# Patient Record
Sex: FEMALE | Race: WHITE | NOT HISPANIC OR LATINO | Employment: FULL TIME | URBAN - METROPOLITAN AREA
[De-identification: names, ages, dates, MRNs, and addresses within clinical notes are randomized per-mention and may not be internally consistent; named-entity substitution may affect disease eponyms.]

---

## 2023-02-22 LAB
ALLERGEN ANIMAL: CAT DANDER IGE (KU/L): <0.1 KU/L
ALLERGEN ANIMAL: DOG DANDER IGE (KU/L): <0.1 KU/L
ALLERGEN GRASS: BERMUDA GRASS (CYNODON DACTYLON) IGE (KU/L): <0.1 KU/L
ALLERGEN GRASS: JOHNSON GRASS (SORGHUM HALEPENSE) IGE (KU/L): <0.1 KU/L
ALLERGEN GRASS: MEADOW GRASS, KENTUCKY BLUE (POA PRATENSIS )IGE (KU/L): <0.1 KU/L
ALLERGEN GRASS: TIMOTHY GRASS (PHLEUM PRATENSE) IGE (KU/L): <0.1 KU/L
ALLERGEN INSECT: COCKROACH IGE: <0.1 KU/L
ALLERGEN MICROORGANISM: ALTERNARIA ALTERNATA IGE (KU/L): <0.1 KU/L
ALLERGEN MICROORGANISM: ASPERGILLUS FUMIGATUS IGE (KU/L): <0.1 KU/L
ALLERGEN MICROORGANISM: CLADOSPORIUM HERBARUM IGE (KU/L): <0.1 KU/L
ALLERGEN MICROORGANISM: PENICILLIUM CHRYSOGENUM (P. NOTATUM) IGE (KU/L): <0.1 KU/L
ALLERGEN MITE: DERMATOPHAGOIDES FARINAE (HOUSE DUST MITE) IGE (KU/L): <0.1 KU/L
ALLERGEN MITE: DERMATOPHAGOIDES PTERONYSSINUS (HOUSE DUST MITE) IGE (KU/L): <0.1 KU/L
ALLERGEN TREE: BOX-ELDER (ACER NEGUNDO) IGE (KU/L): <0.1 KU/L
ALLERGEN TREE: COMMON SILVER BIRCH (BETULA VERRUCOSA) IGE (KU/L): <0.1 KU/L
ALLERGEN TREE: COTTONWOOD (POPULUS DELTOIDES) IGE (KU/L): <0.1 KU/L
ALLERGEN TREE: ELM (ULMUS AMERICANA) IGE (KU/L): <0.1 KU/L
ALLERGEN TREE: MAPLE LEAF SYCAMORE, LONDON PLANE IGE (KU/L): <0.1 KU/L
ALLERGEN TREE: MOUNTAIN JUNIPER (JUNIPERUS SABINOIDES) IGE (KU/L): <0.1 KU/L
ALLERGEN TREE: MULBERRY (MORUS ALBA) IGE (KU/L): <0.1 KU/L
ALLERGEN TREE: OAK (QUERCUS ALBA) IGE (KU/L): <0.1 KU/L
ALLERGEN TREE: PECAN, HICKORY (CARYA PECAN) IGE (KU/L): <0.1 KU/L
ALLERGEN TREE: WALNUT IGE: <0.1 KU/L
ALLERGEN TREE: WHITE ASH (FRAXINUS AMERICANA) IGE (KU/L): <0.1 KU/L
ALLERGEN WEED: COMMON PIGWEED (AMARANTHUS RETROFLEXUS) IGE (KU/L): <0.1 KU/L
ALLERGEN WEED: COMMON RAGWEED (AMB. ARTEMISIIFOLIA/A. ELATIOR) IGE (KU/L): <0.1 KU/L
ALLERGEN WEED: GOOSEFOOT, LAMB'S QUARTERS (CHENOPODIUM ALBUM) IGE (KU/L): <0.1 KU/L
ALLERGEN WEED: PLANTAIN (ENGLISH), RIBWORT (PLANTAGO LANCEOLATA) IGE (KU/L): <0.1 KU/L
ALLERGEN WEED: PRICKLY SALTWORT/RUSSIAN THISTLE (SALSOLA KALI) IGE (KU/L): <0.1 KU/L
ALLERGEN WEED: SHEEP SORREL (RUMEX ACETOSELLA) IGE (KU/L): <0.1 KU/L
IMMUNOCAP IGE: 118 KU/L (ref 0–214)
IMMUNOCAP INTERPRETATION: NORMAL

## 2023-08-11 ENCOUNTER — OFFICE VISIT (OUTPATIENT)
Dept: PRIMARY CARE | Facility: CLINIC | Age: 28
End: 2023-08-11
Payer: COMMERCIAL

## 2023-08-11 VITALS
WEIGHT: 107 LBS | HEART RATE: 81 BPM | HEIGHT: 63 IN | OXYGEN SATURATION: 99 % | DIASTOLIC BLOOD PRESSURE: 83 MMHG | BODY MASS INDEX: 18.96 KG/M2 | SYSTOLIC BLOOD PRESSURE: 117 MMHG

## 2023-08-11 DIAGNOSIS — R05.1 ACUTE COUGH: ICD-10-CM

## 2023-08-11 DIAGNOSIS — R09.81 SINUS CONGESTION: Primary | ICD-10-CM

## 2023-08-11 DIAGNOSIS — H93.8X3 EAR PRESSURE, BILATERAL: ICD-10-CM

## 2023-08-11 PROBLEM — J30.9 ALLERGIC RHINITIS: Status: ACTIVE | Noted: 2023-05-31

## 2023-08-11 PROBLEM — N64.82 CONGENITAL HYPOPLASIA OF BREAST: Status: ACTIVE | Noted: 2023-02-15

## 2023-08-11 PROCEDURE — 1036F TOBACCO NON-USER: CPT | Performed by: STUDENT IN AN ORGANIZED HEALTH CARE EDUCATION/TRAINING PROGRAM

## 2023-08-11 PROCEDURE — 99213 OFFICE O/P EST LOW 20 MIN: CPT | Performed by: STUDENT IN AN ORGANIZED HEALTH CARE EDUCATION/TRAINING PROGRAM

## 2023-08-11 RX ORDER — METHYLPREDNISOLONE 4 MG/1
TABLET ORAL
Qty: 21 TABLET | Refills: 0 | Status: SHIPPED | OUTPATIENT
Start: 2023-08-11 | End: 2023-08-25

## 2023-08-11 ASSESSMENT — ENCOUNTER SYMPTOMS
ABDOMINAL PAIN: 0
HEADACHES: 1
FATIGUE: 0
VOMITING: 0
SHORTNESS OF BREATH: 0
PALPITATIONS: 0
CHILLS: 0
LIGHT-HEADEDNESS: 0
FREQUENCY: 0
FEVER: 0
COUGH: 1
MYALGIAS: 0
DIZZINESS: 1
DIARRHEA: 0
CONSTIPATION: 0
NAUSEA: 0
ARTHRALGIAS: 0
RHINORRHEA: 0

## 2023-08-11 NOTE — PROGRESS NOTES
"Subjective   Patient ID: Aretha Nuñez is a 28 y.o. female who presents for Earache and Headache.    Earache   Associated symptoms include coughing and headaches. Pertinent negatives include no abdominal pain, diarrhea, rhinorrhea or vomiting.   Headache   Associated symptoms include coughing, dizziness and ear pain. Pertinent negatives include no abdominal pain, fever, nausea, rhinorrhea or vomiting.        She was in [facility] last week. There was a lot of elevation changes.  It started with dizziness. Started while driving.  She has been having dizziness and head fog.  Both ears have been hurting.  She has also been having a lot of drainage.  She was taking a sinus medication and then they gave her an antibiotic at the urgent care in [facility].  Her daughter has a runny nose.    Review of Systems   Constitutional:  Negative for chills, fatigue and fever.   HENT:  Positive for ear pain. Negative for congestion and rhinorrhea.    Respiratory:  Positive for cough. Negative for shortness of breath.    Cardiovascular:  Negative for chest pain and palpitations.   Gastrointestinal:  Negative for abdominal pain, constipation, diarrhea, nausea and vomiting.   Genitourinary:  Negative for decreased urine volume and frequency.   Musculoskeletal:  Negative for arthralgias and myalgias.   Neurological:  Positive for dizziness and headaches. Negative for light-headedness.       Objective   /83   Pulse 81   Ht 1.6 m (5' 3\")   Wt 48.5 kg (107 lb)   SpO2 99%   BMI 18.95 kg/m²     Physical Exam  Vitals and nursing note reviewed.   Constitutional:       General: She is not in acute distress.     Appearance: Normal appearance. She is normal weight. She is not ill-appearing or toxic-appearing.   HENT:      Head: Normocephalic and atraumatic.      Right Ear: Tympanic membrane, ear canal and external ear normal.      Left Ear: Tympanic membrane, ear canal and external ear normal.      Nose: Congestion (worse on right) " present.      Mouth/Throat:      Mouth: Mucous membranes are moist.      Comments: Mucus drainage down the posterior oropharynx  Eyes:      Extraocular Movements: Extraocular movements intact.      Conjunctiva/sclera: Conjunctivae normal.      Pupils: Pupils are equal, round, and reactive to light.   Cardiovascular:      Rate and Rhythm: Normal rate and regular rhythm.      Heart sounds: Normal heart sounds.   Pulmonary:      Effort: Pulmonary effort is normal.      Breath sounds: Normal breath sounds.   Abdominal:      General: Abdomen is flat. Bowel sounds are normal.      Palpations: Abdomen is soft.   Neurological:      Mental Status: She is alert.         Assessment/Plan   Problem List Items Addressed This Visit    None  Visit Diagnoses       Sinus congestion    -  Primary    Relevant Medications    methylPREDNISolone (Medrol Dospak) 4 mg tablets    Ear pressure, bilateral        Relevant Medications    methylPREDNISolone (Medrol Dospak) 4 mg tablets    Acute cough        Relevant Medications    methylPREDNISolone (Medrol Dospak) 4 mg tablets          History and physical examination as above.  Patient resenting for upper respiratory concerns.  She been given an antibiotic from an urgent care which has not really helped her symptoms.  Likely viral in nature.  Started on symptomatic treatment as discussed.  Did give a Medrol Dosepak if symptoms worsen over the weekend.  Discussed signs and symptoms that would require reevaluation in the office versus immediate treatment in the emergency department.  She is understanding and agreement with this plan.

## 2023-08-11 NOTE — PATIENT INSTRUCTIONS
Flonase  Allergy medication like Zyrtec  Nasal saline to keep the nose moist.  Netti Pot  Ibuprofen (Do not take if you are taking the steroid)

## 2023-08-25 ENCOUNTER — OFFICE VISIT (OUTPATIENT)
Dept: PRIMARY CARE | Facility: CLINIC | Age: 28
End: 2023-08-25
Payer: COMMERCIAL

## 2023-08-25 ENCOUNTER — LAB (OUTPATIENT)
Dept: LAB | Facility: LAB | Age: 28
End: 2023-08-25
Payer: COMMERCIAL

## 2023-08-25 VITALS
SYSTOLIC BLOOD PRESSURE: 106 MMHG | HEART RATE: 63 BPM | HEIGHT: 63 IN | TEMPERATURE: 97.6 F | BODY MASS INDEX: 19.31 KG/M2 | WEIGHT: 109 LBS | DIASTOLIC BLOOD PRESSURE: 70 MMHG | OXYGEN SATURATION: 100 %

## 2023-08-25 DIAGNOSIS — R42 VERTIGO: ICD-10-CM

## 2023-08-25 DIAGNOSIS — T78.40XD ALLERGIC REACTION, SUBSEQUENT ENCOUNTER: Primary | ICD-10-CM

## 2023-08-25 DIAGNOSIS — R42 LIGHTHEADEDNESS: ICD-10-CM

## 2023-08-25 DIAGNOSIS — T78.40XD ALLERGIC REACTION, SUBSEQUENT ENCOUNTER: ICD-10-CM

## 2023-08-25 DIAGNOSIS — R05.2 SUBACUTE COUGH: ICD-10-CM

## 2023-08-25 PROBLEM — T78.40XA ALLERGIC REACTION: Status: ACTIVE | Noted: 2023-08-25

## 2023-08-25 PROCEDURE — 80053 COMPREHEN METABOLIC PANEL: CPT

## 2023-08-25 PROCEDURE — 1036F TOBACCO NON-USER: CPT | Performed by: FAMILY MEDICINE

## 2023-08-25 PROCEDURE — 36415 COLL VENOUS BLD VENIPUNCTURE: CPT

## 2023-08-25 PROCEDURE — 85025 COMPLETE CBC W/AUTO DIFF WBC: CPT

## 2023-08-25 PROCEDURE — 99214 OFFICE O/P EST MOD 30 MIN: CPT | Performed by: FAMILY MEDICINE

## 2023-08-25 ASSESSMENT — PATIENT HEALTH QUESTIONNAIRE - PHQ9
SUM OF ALL RESPONSES TO PHQ9 QUESTIONS 1 AND 2: 0
1. LITTLE INTEREST OR PLEASURE IN DOING THINGS: NOT AT ALL
2. FEELING DOWN, DEPRESSED OR HOPELESS: NOT AT ALL

## 2023-08-25 NOTE — PATIENT INSTRUCTIONS
Physical exam looks very good today.  Evaluating further for the symptoms or if you are feeling.    Going to do CMP, CBC, chest x-ray.  If all these things look good may need imaging of the brain and ENT evaluation.    Do not drive if you are dizzy or drowsy.    Please do not take amoxicillin.

## 2023-08-25 NOTE — PROGRESS NOTES
"Subjective   Patient ID: Aretha Nuñez is a 28 y.o. female who presents for Follow-up (ER ; feeling light headed).    Patient states over the last 4 weeks just felt off.  Most little balance issue Is swimming in the head.    Patient went to [facility] developed pressure in the sinus area then got treated for sinusitis.  Ended up on Augmentin therapy then after completing Augmentin therapy she broke out in a rash all over had hives all over and is felt to be a delayed reaction to the amoxicillin in the Augmentin therapy.    Patient was on a Medrol Dosepak and an antihistamine this has resolved but still having some swimming in the head??  Having a little bit of dizziness as well.    Patient also has had a little bit of cough.    Patient's had no chest pain no swelling of the legs or feet.  States that she wears a smart watch.  Watch is good her heart rhythm and rate have looked good as well.    Patient is on her menstrual cycle at this time.    Patient had no fever no chills or night sweats.  She was exposed to hand-foot-and-mouth disease and felt like she may have had that as well she states her had a little discomfort in the hands and feet     patient presents for follow-up from the ER.  Patient felt somewhat lightheaded.    Some light headedness.  Some sinus congestion .    Some dizziness.    Took     Review of Systems  As noted in HPI  Objective   /70   Pulse 63   Temp 36.4 °C (97.6 °F)   Ht 1.6 m (5' 3\")   Wt 49.4 kg (109 lb)   LMP 08/23/2023 (Exact Date)   SpO2 100%   BMI 19.31 kg/m²   BSA Body surface area is 1.48 meters squared.      Physical Exam  Cardiovascular:      Rate and Rhythm: Normal rate and regular rhythm.      Pulses: Normal pulses.      Heart sounds: Normal heart sounds.   Pulmonary:      Effort: Pulmonary effort is normal.      Breath sounds: Normal breath sounds.   Skin:     General: Skin is warm.   Neurological:      Mental Status: She is alert.     Ears nose and throat were " unremarkable.  Menard-Hallpike maneuver could not reproduce vertigo.  No ataxia appreciated this time.  Able to balance on 1 foot and the other foot without difficulty Romberg testing negative.  Can heel-to-toe walk without any difficulty.  Able to stand on tiptoes and heels without difficulty.  Finger-to-nose testing intact  Orders Only on 02/21/2023   Component Date Value Ref Range Status    Immunocap IgE 02/21/2023 118.0  0.0 - 214.0 KU/L Final    Comment:  Note:  Omalizumab (Xolair, GeneTMMI ([employer] humanized    IgG1 antihuman IgE Fc) treatment does not    significantly interfere with the accuracy of    total IgE on the ImmunoCAP ([employer]) platform.    J Allergy Clin Immunol 2006;117:759-66).   Allergens, parasitic diseases, smoking, and   alcohol consumption have been reported to   increase levels of total IgE in serum.      Bermuda Grass IgE 02/21/2023 <0.10  <0.35 KU/L Final      SEE IMMUNOCAP INTERP.IGE     Celio Grass IgE 02/21/2023 <0.10  <0.35 KU/L Final      SEE IMMUNOCAP INTERP.IGE     Gilliam Grass, [place] Blue IgE 02/21/2023 <0.10  <0.35 KU/L Final      SEE IMMUNOCAP INTERP.IGE     Juan Grass IgE 02/21/2023 <0.10  <0.35 KU/L Final      SEE IMMUNOCAP INTERP.IGE     Go[place], [place] IgE 02/21/2023 <0.10  <0.35 KU/L Final      SEE IMMUNOCAP INTERP.IGE     Common Pigweed IgE 02/21/2023 <0.10  <0.35 KU/L Final      SEE IMMUNOCAP INTERP.IGE     Common Ragweed IgE 02/21/2023 <0.10  <0.35 KU/L Final      SEE IMMUNOCAP INTERP.IGE     White Serafin IgE 02/21/2023 <0.10  <0.35 KU/L Final      SEE IMMUNOCAP INTERP.IGE     Common Silver Birch IgE 02/21/2023 <0.10  <0.35 KU/L Final      SEE IMMUNOCAP INTERP.IGE     Box-Elder IgE 02/21/2023 <0.10  <0.35 KU/L Final      SEE IMMUNOCAP INTERP.IGE     Mountain Juniper IgE 02/21/2023 <0.10  <0.35 KU/L Final      SEE IMMUNOCAP INTERP.IGE     [place] IgE 02/21/2023 <0.10  <0.35 KU/L Final      SEE IMMUNOCAP INTERP.IGE     El IgE 02/21/2023 <0.10  <0.35 KU/L Final       SEE IMMUNOCAP INTERP.IGE     [place] IgE 02/21/2023 <0.10  <0.35 KU/L Final      SEE IMMUNOCAP INTERP.IGE     [place] IgE 02/21/2023 <0.10  <0.35 KU/L Final      SEE IMMUNOCAP INTERP.IGE     Pecan, [place] IgE 02/21/2023 <0.10  <0.35 KU/L Final      SEE IMMUNOCAP INTERP.IGE     [place], [place] * 02/21/2023 <0.10  <0.35 KU/L Final      SEE IMMUNOCAP INTERP.IGE     [place] Tree IgE 02/21/2023 <0.10  <0.35 KU/L Final      SEE IMMUNOCAP INTERP.IGE     Prickly Saltwort/Russian Thistle I* 02/21/2023 <0.10  <0.35 KU/L Final      SEE IMMUNOCAP INTERP.IGE     Sheep Sorrel IgE 02/21/2023 <0.10  <0.35 KU/L Final      SEE IMMUNOCAP INTERP.IGE     Cat Dander IgE 02/21/2023 <0.10  <0.35 KU/L Final      SEE IMMUNOCAP INTERP.IGE     Dog Dander IgE 02/21/2023 <0.10  <0.35 KU/L Final      SEE IMMUNOCAP INTERP.IGE     Alternaria Alternata IgE 02/21/2023 <0.10  <0.35 KU/L Final      SEE IMMUNOCAP INTERP.IGE     Aspergillus Fumigatus IgE 02/21/2023 <0.10  <0.35 KU/L Final      SEE IMMUNOCAP INTERP.IGE     Cladosporium Herbarum IgE 02/21/2023 <0.10  <0.35 KU/L Final      SEE IMMUNOCAP INTERP.IGE     Penicillium Chrysogenum (P. notatu* 02/21/2023 <0.10  <0.35 KU/L Final      SEE IMMUNOCAP INTERP.IGE     Plantain IgE 02/21/2023 <0.10  <0.35 KU/L Final      SEE IMMUNOCAP INTERP.IGE     Dust Mite (D. farinae) IgE 02/21/2023 <0.10  <0.35 KU/L Final      SEE IMMUNOCAP INTERP.IGE     Dust Mite (D. pteronyssinus) IgE 02/21/2023 <0.10  <0.35 KU/L Final      SEE IMMUNOCAP INTERP.IGE     Nigerian Cockroach IgE 02/21/2023 <0.10  <0.35 KU/L Final      SEE IMMUNOCAP INTERP.IGE     Immunocap Interpretation 02/21/2023 SEE COMMENT   Final    Comment:      REFERENCE RANGE (IMMUNOCAP) IGE   KU/L           CLASS     INTERPRETATION       <  0.10       0       BELOW DETECTION   0.10-  0.34      0/1      EQUIVOCAL   0.35-  0.69       1       LOW POSITIVE   0.70-  3.49       2       MODERATE POSITIVE   3.50- 17.49       3       HIGH  POSITIVE  17.50- 49          4       VERY HIGH POSITIVE  50   - 99          5       VERY HIGH POSITIVE       >100          6       VERY HIGH POSITIVE       Current Outpatient Medications on File Prior to Visit   Medication Sig Dispense Refill    [DISCONTINUED] methylPREDNISolone (Medrol Dospak) 4 mg tablets Take as directed on package. 21 tablet 0     No current facility-administered medications on file prior to visit.     No images are attached to the encounter.            Assessment/Plan   Problem List Items Addressed This Visit       Vertigo    Subacute cough    Allergic reaction - Primary    Lightheadedness

## 2023-08-26 LAB
ALANINE AMINOTRANSFERASE (SGPT) (U/L) IN SER/PLAS: 9 U/L (ref 7–45)
ALBUMIN (G/DL) IN SER/PLAS: 4.7 G/DL (ref 3.4–5)
ALKALINE PHOSPHATASE (U/L) IN SER/PLAS: 41 U/L (ref 33–110)
ANION GAP IN SER/PLAS: 12 MMOL/L (ref 10–20)
ASPARTATE AMINOTRANSFERASE (SGOT) (U/L) IN SER/PLAS: 15 U/L (ref 9–39)
BASOPHILS (10*3/UL) IN BLOOD BY AUTOMATED COUNT: 0.03 X10E9/L (ref 0–0.1)
BASOPHILS/100 LEUKOCYTES IN BLOOD BY AUTOMATED COUNT: 0.5 % (ref 0–2)
BILIRUBIN TOTAL (MG/DL) IN SER/PLAS: 0.7 MG/DL (ref 0–1.2)
CALCIUM (MG/DL) IN SER/PLAS: 10 MG/DL (ref 8.6–10.6)
CARBON DIOXIDE, TOTAL (MMOL/L) IN SER/PLAS: 26 MMOL/L (ref 21–32)
CHLORIDE (MMOL/L) IN SER/PLAS: 107 MMOL/L (ref 98–107)
CREATININE (MG/DL) IN SER/PLAS: 0.61 MG/DL (ref 0.5–1.05)
EOSINOPHILS (10*3/UL) IN BLOOD BY AUTOMATED COUNT: 0.29 X10E9/L (ref 0–0.7)
EOSINOPHILS/100 LEUKOCYTES IN BLOOD BY AUTOMATED COUNT: 5 % (ref 0–6)
ERYTHROCYTE DISTRIBUTION WIDTH (RATIO) BY AUTOMATED COUNT: 12 % (ref 11.5–14.5)
ERYTHROCYTE MEAN CORPUSCULAR HEMOGLOBIN CONCENTRATION (G/DL) BY AUTOMATED: 32.4 G/DL (ref 32–36)
ERYTHROCYTE MEAN CORPUSCULAR VOLUME (FL) BY AUTOMATED COUNT: 99 FL (ref 80–100)
ERYTHROCYTES (10*6/UL) IN BLOOD BY AUTOMATED COUNT: 4.14 X10E12/L (ref 4–5.2)
GFR FEMALE: >90 ML/MIN/1.73M2
GLUCOSE (MG/DL) IN SER/PLAS: 90 MG/DL (ref 74–99)
HEMATOCRIT (%) IN BLOOD BY AUTOMATED COUNT: 41.1 % (ref 36–46)
HEMOGLOBIN (G/DL) IN BLOOD: 13.3 G/DL (ref 12–16)
IMMATURE GRANULOCYTES/100 LEUKOCYTES IN BLOOD BY AUTOMATED COUNT: 0.3 % (ref 0–0.9)
LEUKOCYTES (10*3/UL) IN BLOOD BY AUTOMATED COUNT: 5.8 X10E9/L (ref 4.4–11.3)
LYMPHOCYTES (10*3/UL) IN BLOOD BY AUTOMATED COUNT: 2.19 X10E9/L (ref 1.2–4.8)
LYMPHOCYTES/100 LEUKOCYTES IN BLOOD BY AUTOMATED COUNT: 38 % (ref 13–44)
MONOCYTES (10*3/UL) IN BLOOD BY AUTOMATED COUNT: 0.43 X10E9/L (ref 0.1–1)
MONOCYTES/100 LEUKOCYTES IN BLOOD BY AUTOMATED COUNT: 7.5 % (ref 2–10)
NEUTROPHILS (10*3/UL) IN BLOOD BY AUTOMATED COUNT: 2.8 X10E9/L (ref 1.2–7.7)
NEUTROPHILS/100 LEUKOCYTES IN BLOOD BY AUTOMATED COUNT: 48.7 % (ref 40–80)
NRBC (PER 100 WBCS) BY AUTOMATED COUNT: 0 /100 WBC (ref 0–0)
PLATELETS (10*3/UL) IN BLOOD AUTOMATED COUNT: 241 X10E9/L (ref 150–450)
POTASSIUM (MMOL/L) IN SER/PLAS: 3.9 MMOL/L (ref 3.5–5.3)
PROTEIN TOTAL: 7.4 G/DL (ref 6.4–8.2)
SODIUM (MMOL/L) IN SER/PLAS: 141 MMOL/L (ref 136–145)
UREA NITROGEN (MG/DL) IN SER/PLAS: 8 MG/DL (ref 6–23)

## 2023-08-28 DIAGNOSIS — Z00.00 HEALTH MAINTENANCE EXAMINATION: ICD-10-CM

## 2023-08-28 DIAGNOSIS — R42 VERTIGO: ICD-10-CM

## 2023-09-13 LAB
ALANINE AMINOTRANSFERASE (SGPT) (U/L) IN SER/PLAS: 9 U/L (ref 7–45)
ALBUMIN (G/DL) IN SER/PLAS: 4.7 G/DL (ref 3.4–5)
ALKALINE PHOSPHATASE (U/L) IN SER/PLAS: 47 U/L (ref 33–110)
ANION GAP IN SER/PLAS: 11 MMOL/L (ref 10–20)
ASPARTATE AMINOTRANSFERASE (SGOT) (U/L) IN SER/PLAS: 16 U/L (ref 9–39)
BASOPHILS (10*3/UL) IN BLOOD BY AUTOMATED COUNT: 0.03 X10E9/L (ref 0–0.1)
BASOPHILS/100 LEUKOCYTES IN BLOOD BY AUTOMATED COUNT: 0.4 % (ref 0–2)
BILIRUBIN TOTAL (MG/DL) IN SER/PLAS: 0.5 MG/DL (ref 0–1.2)
CALCIUM (MG/DL) IN SER/PLAS: 9.6 MG/DL (ref 8.6–10.3)
CARBON DIOXIDE, TOTAL (MMOL/L) IN SER/PLAS: 28 MMOL/L (ref 21–32)
CHLORIDE (MMOL/L) IN SER/PLAS: 105 MMOL/L (ref 98–107)
CREATININE (MG/DL) IN SER/PLAS: 0.91 MG/DL (ref 0.5–1.05)
EOSINOPHILS (10*3/UL) IN BLOOD BY AUTOMATED COUNT: 0.71 X10E9/L (ref 0–0.7)
EOSINOPHILS/100 LEUKOCYTES IN BLOOD BY AUTOMATED COUNT: 8.9 % (ref 0–6)
ERYTHROCYTE DISTRIBUTION WIDTH (RATIO) BY AUTOMATED COUNT: 12.1 % (ref 11.5–14.5)
ERYTHROCYTE MEAN CORPUSCULAR HEMOGLOBIN CONCENTRATION (G/DL) BY AUTOMATED: 33.3 G/DL (ref 32–36)
ERYTHROCYTE MEAN CORPUSCULAR VOLUME (FL) BY AUTOMATED COUNT: 96 FL (ref 80–100)
ERYTHROCYTES (10*6/UL) IN BLOOD BY AUTOMATED COUNT: 4.38 X10E12/L (ref 4–5.2)
GFR FEMALE: 87 ML/MIN/1.73M2
GLUCOSE (MG/DL) IN SER/PLAS: 72 MG/DL (ref 74–99)
HEMATOCRIT (%) IN BLOOD BY AUTOMATED COUNT: 42 % (ref 36–46)
HEMOGLOBIN (G/DL) IN BLOOD: 14 G/DL (ref 12–16)
IGA (MG/DL) IN SER/PLAS: 280 MG/DL (ref 70–400)
IGG (MG/DL) IN SER/PLAS: 1470 MG/DL (ref 700–1600)
IGM (MG/DL) IN SER/PLAS: 110 MG/DL (ref 40–230)
IMMATURE GRANULOCYTES/100 LEUKOCYTES IN BLOOD BY AUTOMATED COUNT: 0.4 % (ref 0–0.9)
LEUKOCYTES (10*3/UL) IN BLOOD BY AUTOMATED COUNT: 8 X10E9/L (ref 4.4–11.3)
LYMPHOCYTES (10*3/UL) IN BLOOD BY AUTOMATED COUNT: 2.37 X10E9/L (ref 1.2–4.8)
LYMPHOCYTES/100 LEUKOCYTES IN BLOOD BY AUTOMATED COUNT: 29.7 % (ref 13–44)
MONOCYTES (10*3/UL) IN BLOOD BY AUTOMATED COUNT: 0.56 X10E9/L (ref 0.1–1)
MONOCYTES/100 LEUKOCYTES IN BLOOD BY AUTOMATED COUNT: 7 % (ref 2–10)
NEUTROPHILS (10*3/UL) IN BLOOD BY AUTOMATED COUNT: 4.27 X10E9/L (ref 1.2–7.7)
NEUTROPHILS/100 LEUKOCYTES IN BLOOD BY AUTOMATED COUNT: 53.6 % (ref 40–80)
PLATELETS (10*3/UL) IN BLOOD AUTOMATED COUNT: 325 X10E9/L (ref 150–450)
POTASSIUM (MMOL/L) IN SER/PLAS: 4.3 MMOL/L (ref 3.5–5.3)
PROTEIN TOTAL: 8 G/DL (ref 6.4–8.2)
SODIUM (MMOL/L) IN SER/PLAS: 140 MMOL/L (ref 136–145)
UREA NITROGEN (MG/DL) IN SER/PLAS: 13 MG/DL (ref 6–23)

## 2023-09-14 ENCOUNTER — TELEPHONE (OUTPATIENT)
Dept: PRIMARY CARE | Facility: CLINIC | Age: 28
End: 2023-09-14
Payer: COMMERCIAL

## 2023-09-14 NOTE — TELEPHONE ENCOUNTER
Pt had labs done yesterday at [facility]. It showed her blood sugar was low and she wanted to know if that's the cause for her dizziness. Please advice.

## 2023-09-15 DIAGNOSIS — R42 LIGHTHEADEDNESS: ICD-10-CM

## 2023-09-18 ENCOUNTER — LAB (OUTPATIENT)
Dept: LAB | Facility: LAB | Age: 28
End: 2023-09-18
Payer: COMMERCIAL

## 2023-09-18 DIAGNOSIS — R42 LIGHTHEADEDNESS: ICD-10-CM

## 2023-09-18 DIAGNOSIS — Z00.00 HEALTH MAINTENANCE EXAMINATION: ICD-10-CM

## 2023-09-18 LAB — COMPLEMENT TOTAL (CH50): 60.2 U/ML (ref 38.7–89.9)

## 2023-09-18 PROCEDURE — 82947 ASSAY GLUCOSE BLOOD QUANT: CPT

## 2023-09-18 PROCEDURE — 84702 CHORIONIC GONADOTROPIN TEST: CPT

## 2023-09-18 PROCEDURE — 36415 COLL VENOUS BLD VENIPUNCTURE: CPT

## 2023-09-19 LAB — CHORIOGONADOTROPIN (MIU/ML) IN SER/PLAS: <3 IU/L

## 2023-09-21 LAB
PNEUMO SEROTYPE INTERPRETATION: NORMAL
SEROTYPE 1, VIRC: 0.84 UG/ML
SEROTYPE 10A(34), VIRC: 3.47 UG/ML
SEROTYPE 11A(43), VIRC: 1.11 UG/ML
SEROTYPE 12F, VIRC: 0.77 UG/ML
SEROTYPE 14, VIRC: 0.27 UG/ML
SEROTYPE 15B(54), VIRC: 12.21 UG/ML
SEROTYPE 17F, VIRC: 1.16 UG/ML
SEROTYPE 18C(56), VIRC: 1.95 UG/ML
SEROTYPE 19A(57), VIRC: 3.12 UG/ML
SEROTYPE 19F, VIRC: 5.54 UG/ML
SEROTYPE 2, VIRC: 4.17 UG/ML
SEROTYPE 20, VIRC: 0.57 UG/ML
SEROTYPE 22F, VIRC: 0.51 UG/ML
SEROTYPE 23F, VIRC: 2.59 UG/ML
SEROTYPE 3, VIRC: 6.11 UG/ML
SEROTYPE 33F(70), VIRC: 1.92 UG/ML
SEROTYPE 4, VIRC: 1.16 UG/ML
SEROTYPE 5, VIRC: 0.34 UG/ML
SEROTYPE 6B(26), VIRC: 0.77 UG/ML
SEROTYPE 7F(51), VIRC: 0.77 UG/ML
SEROTYPE 8, VIRC: 0.45 UG/ML
SEROTYPE 9N, VIRC: 5.75 UG/ML
SEROTYPE 9V(68), VIRC: 2.65 UG/ML

## 2023-09-22 LAB
GLUCOSE TOLERANCE TEST FASTING: 57 MG/DL
GLUCOSE TOLERANCE TEST TWO HOUR: NORMAL MG/DL
GTTC3: NORMAL

## 2023-09-26 DIAGNOSIS — R42 LIGHTHEADEDNESS: ICD-10-CM

## 2023-09-26 PROBLEM — J32.9 CHRONIC SINUSITIS: Status: ACTIVE | Noted: 2023-09-26

## 2023-09-29 ENCOUNTER — TELEPHONE (OUTPATIENT)
Dept: PRIMARY CARE | Facility: CLINIC | Age: 28
End: 2023-09-29
Payer: COMMERCIAL

## 2023-09-29 DIAGNOSIS — R73.09 ABNORMAL GLUCOSE LEVEL: ICD-10-CM

## 2023-09-29 DIAGNOSIS — I73.9 MICROANGIOPATHY (CMS-HCC): ICD-10-CM

## 2023-09-29 DIAGNOSIS — G43.909 MIGRAINE WITHOUT STATUS MIGRAINOSUS, NOT INTRACTABLE, UNSPECIFIED MIGRAINE TYPE: ICD-10-CM

## 2023-09-29 RX ORDER — MONTELUKAST SODIUM 10 MG/1
1 TABLET ORAL DAILY
COMMUNITY
Start: 2023-09-13 | End: 2023-12-13 | Stop reason: ALTCHOICE

## 2023-09-29 NOTE — TELEPHONE ENCOUNTER
Pt given n&n of Dr. Gupta #[phone] and Dr. Tavera #[phone]. Referrals faxed and pt will make an appt.

## 2023-10-13 ENCOUNTER — APPOINTMENT (OUTPATIENT)
Dept: RADIOLOGY | Facility: CLINIC | Age: 28
End: 2023-10-13
Payer: COMMERCIAL

## 2023-10-17 ENCOUNTER — ANCILLARY PROCEDURE (OUTPATIENT)
Dept: RADIOLOGY | Facility: CLINIC | Age: 28
End: 2023-10-17
Payer: COMMERCIAL

## 2023-10-17 DIAGNOSIS — J32.9 CHRONIC SINUSITIS, UNSPECIFIED: ICD-10-CM

## 2023-10-17 PROCEDURE — 70486 CT MAXILLOFACIAL W/O DYE: CPT

## 2023-10-17 PROCEDURE — 70486 CT MAXILLOFACIAL W/O DYE: CPT | Performed by: RADIOLOGY

## 2023-10-18 DIAGNOSIS — J32.2 CHRONIC ETHMOIDAL SINUSITIS: Primary | ICD-10-CM

## 2023-10-18 RX ORDER — DOXYCYCLINE 100 MG/1
100 CAPSULE ORAL 2 TIMES DAILY
Qty: 40 CAPSULE | Refills: 0 | Status: SHIPPED | OUTPATIENT
Start: 2023-10-18 | End: 2023-11-07

## 2023-11-01 ENCOUNTER — OFFICE VISIT (OUTPATIENT)
Dept: OTOLARYNGOLOGY | Facility: CLINIC | Age: 28
End: 2023-11-01
Payer: COMMERCIAL

## 2023-11-01 VITALS — BODY MASS INDEX: 20.13 KG/M2 | TEMPERATURE: 97.7 F | WEIGHT: 109.4 LBS | HEIGHT: 62 IN

## 2023-11-01 DIAGNOSIS — J34.89 NASAL OBSTRUCTION: ICD-10-CM

## 2023-11-01 DIAGNOSIS — J34.89 NASAL AND SINUS DISCHARGE: ICD-10-CM

## 2023-11-01 DIAGNOSIS — J34.3 HYPERTROPHY OF INFERIOR NASAL TURBINATE: ICD-10-CM

## 2023-11-01 DIAGNOSIS — J32.2 CHRONIC ETHMOIDAL SINUSITIS: ICD-10-CM

## 2023-11-01 DIAGNOSIS — J32.4 CHRONIC PANSINUSITIS: Primary | ICD-10-CM

## 2023-11-01 PROCEDURE — 1036F TOBACCO NON-USER: CPT | Performed by: NURSE PRACTITIONER

## 2023-11-01 PROCEDURE — 99204 OFFICE O/P NEW MOD 45 MIN: CPT | Performed by: NURSE PRACTITIONER

## 2023-11-01 PROCEDURE — 31231 NASAL ENDOSCOPY DX: CPT | Performed by: NURSE PRACTITIONER

## 2023-11-01 RX ORDER — PREDNISONE 10 MG/1
TABLET ORAL
Qty: 19 TABLET | Refills: 0 | Status: SHIPPED | OUTPATIENT
Start: 2023-11-01 | End: 2024-01-03 | Stop reason: HOSPADM

## 2023-11-01 ASSESSMENT — PATIENT HEALTH QUESTIONNAIRE - PHQ9
2. FEELING DOWN, DEPRESSED OR HOPELESS: NOT AT ALL
1. LITTLE INTEREST OR PLEASURE IN DOING THINGS: NOT AT ALL
SUM OF ALL RESPONSES TO PHQ9 QUESTIONS 1 AND 2: 0

## 2023-11-01 NOTE — PROGRESS NOTES
Subjective   Patient ID: Aretha Nuñez is a 28 y.o. female who presents for Sinus Problem.  HPI  Aretha Nuñez is a 28 y.o. old female, referred by Dr. Jovita Gaona,  presenting with complaints of sinus and nose problems that began about 1.5 years ago. The patient describes the sinus/nose problems as gradual onset of nasal obstruction (bilateral, worse with laying down). She also notes nasal drainage (anterior/posterior, clear). She also has frontal area pressure. She has had a significantly diminished sense of smell over the last year. Patient denies facial pain, periorbital edema, throat clearing, hoarseness, loss of taste. The patient denies epistaxis. The patient has taken antibiotics, claritin medications to alleviate the problem. She has tried flonase, vicks. The medication claritin has  helped. The patient has tried nasal saline irrigations. Does not have a history of allergic rhinitis or allergies. They deny a history of aspirin sensitivity. They report 2 sinus infections per year requiring antibiotic treatment. Most recent antibiotic usage includes: Doxycycline (current) x 20 days (on day 14).     History of prior nasal/sinus surgery or procedure: Denies    I personally reviewed the patients CT scan images and results. I discussed the results personally with the patient. The following findings were discussed: 10/17/23: CT Sinus: Left nasal septal deviation. Near complete opacification of the ethmoid sinuses bilaterally. Moderate mucosal thickening of the bilateral maxillary sinuses. Minimal of the frontal, sphenoid sinuses.     I have also reviewed prior note from Dr. Jovita Gaona dated 10/11/23  and this is contributing to my history and assessment.     Review of Systems  Review of systems is negative for constitutional, ophthalmological, cardiac, pulmonary, renal, gastrointestinal, musculoskeletal, mental health, endocrine, or neurologic disorders (except as listed in the HPI, PMH, and Problem  List).     Objective   Physical Exam  CONSTITUTIONAL: Vital signs reviewed. Patient appears well developed and well nourished.   GENERAL: this is a healthy appearing female who appears stated age. The patient is alert and appropriately verbally conversant without hoarseness. This patient is in no apparent distress.   FACE: The face was inspected and no cutaneous masses or lesions were visualized. There was no erythema or edema noted. Facial movement was symmetric. No skin lesions were detected. There was no sinus tenderness elicited. TMJ crepitus present.   EYES: Extra-ocular muscle function was intact. No nystagmus was observed. Pupils were equal.   CRANIAL NERVES: Cranial nerves II, III, IV, and VI were noted to be intact via extra-ocular muscle movement testing. Cranial nerve VII noted to be intact and symmetric by facial movement. Cranial nerves IX and X noted to be intact by gag reflex and palatal movement. Cranial nerve XII noted to be intact by active and symmetric tongue movement.   NOSE: Examination of the nose revealed the nasal dorsum to be midline. Intranasal exam reveals the septum is deviated left. The inferior turbinates were hypertrophic. No masses, polyps, mucopus, or other lesion on anterior rhinoscopy. See below procedure note as applicable for further exam.  ORAL CAVITY: Examination of the oral cavity revealed no mass lesions nor infection. The palate was noted to be intact. The tongue exhibited normal mobility. Mucosa was moist without lesion. The lips were free of lesion. Gums were free of inflammation. Dentition: normal without obvious infection or inflammation  OROPHARYNX: The oral pharynx was free of mass lesion or mucosal abnormality. The palate was noted to be without lesion. The uvula was normal appearing. The tonsils were Normal.  EARS: Examination of the ears revealed that the auricles were normally formed with no lesions. The external auditory canals were normal. The tympanic membranes  were intact.  There is no inflammation visualized.   NECK: Visualization and palpation of the neck revealed no mass lesions. No skin lesions or inflammatory processes were detected. The cervical musculature was normal to palpation.   CERVICAL LYMPHATICS: There were no palpable lymph nodes in the posterior triangle, submandibular triangle, jugulodigastric region, or central neck.  RESPIRATORY: Normal inspiration and expiration and chest wall expansion, no use of accessory muscles to breathe, no stridor.  NEUROLOGICAL: Patient is ambulatory without assist. Mentation is clear. Answering questions appropriately.     Nasal / sinus endoscopy    Date/Time: 11/1/2023 2:48 PM    Performed by: CHYNA Nelson  Authorized by: CHYNA Nelson    Consent:     Consent obtained:  Verbal    Consent given by:  Patient    Risks discussed:  Bleeding, infection and pain    Alternatives discussed:  No treatment, observation and delayed treatment  Procedure details:     Indications: assessment of airway and sino-nasal symptoms      Medication:  Afrin and Anuj-Synephrine 2%    Instrument: flexible fiberoptic nasal endoscope      Scope location: bilateral nare    Post-procedure details:     Patient tolerance of procedure:  Tolerated well, no immediate complications  Comments:      Findings: After topical decongestion with decongestant and anesthetic spray, nasal endoscopy was performed using an endoscope. The septum was deviated left. The inferior turbinates were hypertrophic.  The middle turbinates appeared edematous with large polypoid changes, the middle meatus is free of purulence, masses, lesions or polyps. There is chronic appearing thick mucous from the middle meatus and sphenoethmoid recess on the right.  The superior meatus and sphenoethmoid recess is limited visualization on the left. The nasopharynx was clear. There were no complications and the patient tolerated the procedure well.         Assessment/Plan     Aretha Nuñez is a 28 y.o. year old female with symptoms of nasal obstruction, hyposmia and clinical findings consistent with chronic rhinosinusitis with early nasal polyposis and a left nasal septal deviation. Rec. Treatment with prednisone and complete oral antibiotic course. Surgical referral to Dr. Suraj Fitzpatrick.      PLAN:  1) Nasal Endoscopy today.Findings: left dev, polyps to bilateral middle turbinates and edema at the middle meatus bilaterally with thick drainage.  2) I personally reviewed the patients CT scan images and results. I discussed the results personally with the patient. The following findings were discussed: 10/17/23: CT Sinus: Left nasal septal deviation. Near complete opacification of the ethmoid sinuses bilaterally. Moderate mucosal thickening of the bilateral maxillary sinuses. Minimal of the frontal, sphenoid sinuses.   Reassurance and counseling provided to patient and his condition was extensively discussed including as noted below:  3) We will start the patient on prednisone to complete maximal medical therapy. Start prednisone taper. Prescription placed. Discussed potential side effects of oral steroid use were discussed at length with the patient. These risks include but are not limited to: difficulty sleeping/insomnia, increased appetite, fluid retention, mood swings, weight gain, change in blood pressure, high blood glucose, possible adrenal suppression, osteoporosis, avascular necrosis of the hip, menstrual irregularities (if applicable), and cataracts. The patient understands these risks and is willing to proceed with oral steroid therapy.   4) Patient to complete doxycycline to complete maximum medical therapy.  5) Patient was instructed to continue steroid nasal spray.  6) A CT scan of the sinuses without contrast will be obtained post treatment.  7) The patient and myself had an extensive discussion regarding next steps for further management. We discussed  endoscopic sinus surgery at length. We discussed implications for treatment. Patient referred for surgical management with Dr. Suraj Fitzpatrick. His office was contacted directly for scheduling.

## 2023-11-01 NOTE — PATIENT INSTRUCTIONS
Today you were evaluated by Hilary Thibodeaux CNP.    Please follow-up in as needed. If you have any questions or concerns, please contact my office at [phone].     Start prednisone taper. Prescription placed. Discussed potential side effects of oral steroid use were discussed at length with the patient. These risks include but are not limited to: difficulty sleeping/insomnia, increased appetite, fluid retention, mood swings, weight gain, change in blood pressure, high blood glucose, possible adrenal suppression, osteoporosis, avascular necrosis of the hip, menstrual irregularities (if applicable), and cataracts. The patient understands these risks and is willing to proceed with oral steroid therapy.     Please contact our scheduling and  service at [phone]. They will work with you to get your test, imaging, or other appointments scheduled that might have been ordered.     A CT scan was ordered today. This is a non-contrast CT scan. Please call the  to create an appointment for this scan. However, we do not want you to obtain the imaging until you have completed the full course of the antibiotic medication prescribed. We would also like for you to obtain this CT scan at a [facility] facility.

## 2023-11-01 NOTE — LETTER
November 1, 2023     Jovita Flores DO  [address] [facility]  [address]  [facility] [address]    Patient: Aretha Nuñez   YOB: 1995   Date of Visit: 11/1/2023       Dear Dr. Jovita Flores DO:    Thank you for referring Aretha Nuñez to me for evaluation. Below are my notes for this consultation.  If you have questions, please do not hesitate to call me. I look forward to following your patient along with you.       Sincerely,     Hilary Thibodeaux, APRN-CNP      CC: No Recipients  ______________________________________________________________________________________    Subjective   Patient ID: Aretha Nuñez is a 28 y.o. female who presents for Sinus Problem.  HPI  Aretha Nuñez is a 28 y.o. old female, referred by Dr. Jovita Gaona,  presenting with complaints of sinus and nose problems that began about 1.5 years ago. The patient describes the sinus/nose problems as gradual onset of nasal obstruction (bilateral, worse with laying down). She also notes nasal drainage (anterior/posterior, clear). She also has frontal area pressure. She has had a significantly diminished sense of smell over the last year. Patient denies facial pain, periorbital edema, throat clearing, hoarseness, loss of taste. The patient denies epistaxis. The patient has taken antibiotics, claritin medications to alleviate the problem. She has tried flonase, vicks. The medication claritin has  helped. The patient has tried nasal saline irrigations. Does not have a history of allergic rhinitis or allergies. They deny a history of aspirin sensitivity. They report 2 sinus infections per year requiring antibiotic treatment. Most recent antibiotic usage includes: Doxycycline (current) x 20 days (on day 14).     History of prior nasal/sinus surgery or procedure: Denies    I personally reviewed the patients CT scan images and results. I discussed the results personally with the patient. The following findings were  discussed: 10/17/23: CT Sinus: Left nasal septal deviation. Near complete opacification of the ethmoid sinuses bilaterally. Moderate mucosal thickening of the bilateral maxillary sinuses. Minimal of the frontal, sphenoid sinuses.     I have also reviewed prior note from Dr. Jovita Gaona dated 10/11/23  and this is contributing to my history and assessment.     Review of Systems  Review of systems is negative for constitutional, ophthalmological, cardiac, pulmonary, renal, gastrointestinal, musculoskeletal, mental health, endocrine, or neurologic disorders (except as listed in the HPI, PMH, and Problem List).     Objective   Physical Exam  CONSTITUTIONAL: Vital signs reviewed. Patient appears well developed and well nourished.   GENERAL: this is a healthy appearing female who appears stated age. The patient is alert and appropriately verbally conversant without hoarseness. This patient is in no apparent distress.   FACE: The face was inspected and no cutaneous masses or lesions were visualized. There was no erythema or edema noted. Facial movement was symmetric. No skin lesions were detected. There was no sinus tenderness elicited. TMJ crepitus present.   EYES: Extra-ocular muscle function was intact. No nystagmus was observed. Pupils were equal.   CRANIAL NERVES: Cranial nerves II, III, IV, and VI were noted to be intact via extra-ocular muscle movement testing. Cranial nerve VII noted to be intact and symmetric by facial movement. Cranial nerves IX and X noted to be intact by gag reflex and palatal movement. Cranial nerve XII noted to be intact by active and symmetric tongue movement.   NOSE: Examination of the nose revealed the nasal dorsum to be midline. Intranasal exam reveals the septum is deviated left. The inferior turbinates were hypertrophic. No masses, polyps, mucopus, or other lesion on anterior rhinoscopy. See below procedure note as applicable for further exam.  ORAL CAVITY: Examination of the oral  cavity revealed no mass lesions nor infection. The palate was noted to be intact. The tongue exhibited normal mobility. Mucosa was moist without lesion. The lips were free of lesion. Gums were free of inflammation. Dentition: normal without obvious infection or inflammation  OROPHARYNX: The oral pharynx was free of mass lesion or mucosal abnormality. The palate was noted to be without lesion. The uvula was normal appearing. The tonsils were Normal.  EARS: Examination of the ears revealed that the auricles were normally formed with no lesions. The external auditory canals were normal. The tympanic membranes were intact.  There is no inflammation visualized.   NECK: Visualization and palpation of the neck revealed no mass lesions. No skin lesions or inflammatory processes were detected. The cervical musculature was normal to palpation.   CERVICAL LYMPHATICS: There were no palpable lymph nodes in the posterior triangle, submandibular triangle, jugulodigastric region, or central neck.  RESPIRATORY: Normal inspiration and expiration and chest wall expansion, no use of accessory muscles to breathe, no stridor.  NEUROLOGICAL: Patient is ambulatory without assist. Mentation is clear. Answering questions appropriately.     Nasal / sinus endoscopy    Date/Time: 11/1/2023 2:48 PM    Performed by: CHYNA Nelson  Authorized by: CHYNA Nelson    Consent:     Consent obtained:  Verbal    Consent given by:  Patient    Risks discussed:  Bleeding, infection and pain    Alternatives discussed:  No treatment, observation and delayed treatment  Procedure details:     Indications: assessment of airway and sino-nasal symptoms      Medication:  Afrin and Anuj-Synephrine 2%    Instrument: flexible fiberoptic nasal endoscope      Scope location: bilateral nare    Post-procedure details:     Patient tolerance of procedure:  Tolerated well, no immediate complications  Comments:      Findings: After topical  decongestion with decongestant and anesthetic spray, nasal endoscopy was performed using an endoscope. The septum was deviated left. The inferior turbinates were hypertrophic.  The middle turbinates appeared edematous with large polypoid changes, the middle meatus is free of purulence, masses, lesions or polyps. There is chronic appearing thick mucous from the middle meatus and sphenoethmoid recess on the right.  The superior meatus and sphenoethmoid recess is limited visualization on the left. The nasopharynx was clear. There were no complications and the patient tolerated the procedure well.        Assessment/Plan     Aretha Nuñez is a 28 y.o. year old female with symptoms of nasal obstruction, hyposmia and clinical findings consistent with chronic rhinosinusitis with early nasal polyposis and a left nasal septal deviation. Rec. Treatment with prednisone and complete oral antibiotic course. Surgical referral to Dr. Suraj Fitzpatrick.      PLAN:  1) Nasal Endoscopy today.Findings: left dev, polyps to bilateral middle turbinates and edema at the middle meatus bilaterally with thick drainage.  2) I personally reviewed the patients CT scan images and results. I discussed the results personally with the patient. The following findings were discussed: 10/17/23: CT Sinus: Left nasal septal deviation. Near complete opacification of the ethmoid sinuses bilaterally. Moderate mucosal thickening of the bilateral maxillary sinuses. Minimal of the frontal, sphenoid sinuses.   Reassurance and counseling provided to patient and his condition was extensively discussed including as noted below:  3) We will start the patient on prednisone to complete maximal medical therapy. Start prednisone taper. Prescription placed. Discussed potential side effects of oral steroid use were discussed at length with the patient. These risks include but are not limited to: difficulty sleeping/insomnia, increased appetite, fluid retention, mood swings,  weight gain, change in blood pressure, high blood glucose, possible adrenal suppression, osteoporosis, avascular necrosis of the hip, menstrual irregularities (if applicable), and cataracts. The patient understands these risks and is willing to proceed with oral steroid therapy.   4) Patient to complete doxycycline to complete maximum medical therapy.  5) Patient was instructed to continue steroid nasal spray.  6) A CT scan of the sinuses without contrast will be obtained post treatment.  7) The patient and myself had an extensive discussion regarding next steps for further management. We discussed endoscopic sinus surgery at length. We discussed implications for treatment. Patient referred for surgical management with Dr. Suraj Fitzpatrick. His office was contacted directly for scheduling.

## 2023-11-08 ENCOUNTER — APPOINTMENT (OUTPATIENT)
Dept: ALLERGY | Facility: CLINIC | Age: 28
End: 2023-11-08
Payer: COMMERCIAL

## 2023-11-16 ENCOUNTER — ANCILLARY PROCEDURE (OUTPATIENT)
Dept: RADIOLOGY | Facility: CLINIC | Age: 28
End: 2023-11-16
Payer: COMMERCIAL

## 2023-11-16 DIAGNOSIS — J34.89 NASAL AND SINUS DISCHARGE: ICD-10-CM

## 2023-11-16 DIAGNOSIS — J34.89 NASAL OBSTRUCTION: ICD-10-CM

## 2023-11-16 DIAGNOSIS — J34.3 HYPERTROPHY OF INFERIOR NASAL TURBINATE: ICD-10-CM

## 2023-11-16 DIAGNOSIS — J32.2 CHRONIC ETHMOIDAL SINUSITIS: ICD-10-CM

## 2023-11-16 DIAGNOSIS — J32.4 CHRONIC PANSINUSITIS: ICD-10-CM

## 2023-11-16 PROCEDURE — 70486 CT MAXILLOFACIAL W/O DYE: CPT | Performed by: RADIOLOGY

## 2023-11-16 PROCEDURE — 70486 CT MAXILLOFACIAL W/O DYE: CPT

## 2023-11-28 NOTE — PATIENT INSTRUCTIONS
You are a good candidate for endoscopic sinus surgery and a septoplasty. After your sinus surgery, you will need to do frequent nasal saline irrigations. This will extremely important so that your sinuses stay open after surgery. Please review the instructions below to prepare for surgery.    You should be contacted by my surgical schedulers, Sydney Murrieta or Hilary Morgan within the next 5 business days to schedule your procedure. If you have not heard from us by then, please call [phone] and ask to schedule surgery. We are currently booking into mid-December or January.    PATIENT INFORMATION/INSTRUCTIONS PRIOR TO SINUS SURGERY: *Please Read Carefully*    Dr. Fitzpatrick discussed the rationale for endoscopic sinus surgery, along with the benefits, risks, potential complications, and side effects of the procedure with you today. If you have any questions about these subjects, please feel free to call our office at [phone].    You can expect after surgery to have increased symptoms and congestion for at least 1 week and sometimes upward of a month. Everyone's body reacts differently. You will have at least 2 visits with Dr. Fitzpatrick for cleaning/debridement of the sinuses in the office after surgery, approximately 1 week and 1 month after surgery. This is necessary to ensure the sinuses heal well.    As described, your sinus surgery is only half the cordero. Post operatively, it is VITAL that you continue the nasal irrigations and other medications directed by Dr. Fitzpatrick. You will be given an instructional sheet post operatively that describes the expected disease course including nasal care. Dr. Fitzpatrick will want you to start irrigations of the nose with saline post operatively. This is also important to keep the nose and sinuses open.    STOP TAKING THESE MEDICATIONS prior to surgery:  Aspirin - 10 to 14 days before surgery  Plavix/clopidogrel - 10 days before surgery  NSAIDs - 7 days before surgery (NSAIDs  include painkillers like Motrin, Aleve, Naprosyn, Mobic, diclofenac, and ibuprofen)  Vitamin E - 10 to 14 days before surgery  Multivitamins with Vitamin E - 10 to 14 days before surgery  Herbal Medications/Diet Pills - 10 to 14 days before surgery    5.   Avoid NSAIDs for PAIN RELIEF. If needed, you could take Tylenol on the day of surgery with sips of water. You may also use opiates prescribed by your physician which includes medications like Percocet / Vicodin / MS Contin / Darvocet / Ultram.    6.   BLOOD THINNERS including Coumadin, Plavix, and Ticlid are to be stopped as directed by surgeon/cardiologist or as listed below.    7.   FOR PATIENTS WITH ASTHMA/COPD:  Use your inhalers as prescribed/as needed on the day of surgery. Bring your inhalers with you to the hospital. If you have Obstructive Sleep Apnea and are on a CPAP/BiPAP machine, please bring it with you to the hospital.    8.   On the day of surgery, DO NOT wear jewelry, body piercings, makeup, nail polish, hairpins, or contacts. Leave all valuables and money with family members. If you have dentures, please leave these with family members.    9.   IF you are undergoing an OUTPATIENT procedure (Ambulatory Surgery - going home on the same day), you must have someone drive you home and stay with you for 24 hours after surgery. You cannot stay alone in a hotel room after outpatient surgery. Also, a  or  cannot be made a responsible caregiver. Your surgery may be cancelled if you do not have someone take care of you for 24 hours.    10.  You will be given a time to arrive the business day before surgery. If you do not hear about a time by 4:30 pm the business day before surgery, please call [phone] and ask for a time.    AFTER SURGERY, please observe the following precautions:  Refrain from blowing your nose for at least 2 weeks from the date of your surgery. Please do not stifle any sneezes. If you must sneeze, then try to  sneeze through an open mouth. Please do not stick anything in your nose other than any medicine or irrigations we recommend. Please do not insert a Q-tip or finger in the nose after surgery as this can cause further trauma. Please refrain from any activities that will increase your heart rate or blood pressure for at least 2 weeks from the date of your surgery. Try to keep your head above your heart as much as possible. Please refrain from lifting objects greater than 10 lbs for at least 2 weeks from the date of your surgery.    Scribe Attestation  By signing my name below, I, Lucio Gardner, attest that this documentation has been prepared under the direction and in the presence of Suraj Fitzpatrick MD. All medical record entries made by the Scribe were at my direction or personally dictated by me. I have reviewed the chart and agree that the record accurately reflects my personal performance of the history, physical exam, discussion and plan.

## 2023-11-28 NOTE — PROGRESS NOTES
"HPI   11/29/23 - Aretha Nuñez is a 28 y.o. female, referred by Hilary Thibodeaux for a surgical consult. The patient reports concerns of sinus and nose problems that started about 1.5 years ago. She describes her main symptoms as #1 nasal obstruction, #2 generalized fatigue or \"feeling off\", #3 significantly reduced smell, #4 waking up with daily facial pressure/pain, #5 frequent sneezing with posterior nasal drainage. Over the last month, her vision has been getting blurry and she sees black dots. She has been taking an allergy pill, which helps reduce the sneezing. She does not have a history of allergic rhinitis or allergies. Allergy testing was negative. She reports falling on her face as a baby. She is eager to get relief of sinus symptoms and wishes to pursue surgery. She notes that her  and mother-in-law are both very involved in caring for her children and can help with this after surgery.    Per Hilary Thibodeaux's prior note 11/1/23:  Aretha Nuñez is a 28 y.o. old female, referred by Dr. Jovita Gaona,  presenting with complaints of sinus and nose problems that began about 1.5 years ago. The patient describes the sinus/nose problems as gradual onset of nasal obstruction (bilateral, worse with laying down). She also notes nasal drainage (anterior/posterior, clear). She also has frontal area pressure. She has had a significantly diminished sense of smell over the last year. Patient denies facial pain, periorbital edema, throat clearing, hoarseness, loss of taste. The patient denies epistaxis. The patient has taken antibiotics, claritin medications to alleviate the problem. She has tried Flonase, Vicks. The medication Claritin has helped. The patient has tried nasal saline irrigations. Does not have a history of allergic rhinitis or allergies. Allergy testing was negative. They deny a history of aspirin sensitivity. They report 2 sinus infections per year requiring antibiotic treatment. Most recent " antibiotic usage includes: Doxycycline (current) x 20 days (on day 14).    History of prior nasal/sinus surgery or procedure: Denies    No past medical history on file.    No past surgical history on file.    Social History     Socioeconomic History    Marital status:      Spouse name: Not on file    Number of children: Not on file    Years of education: Not on file    Highest education level: Not on file   Occupational History    Not on file   Tobacco Use    Smoking status: Never     Passive exposure: Never    Smokeless tobacco: Never   Vaping Use    Vaping Use: Never used   Substance and Sexual Activity    Alcohol use: Not Currently    Drug use: Never    Sexual activity: Not on file   Other Topics Concern    Not on file   Social History Narrative    Not on file     Social Determinants of Health     Financial Resource Strain: Not on file   Food Insecurity: Not on file   Transportation Needs: Not on file   Physical Activity: Not on file   Stress: Not on file   Social Connections: Not on file   Intimate Partner Violence: Not on file   Housing Stability: Not on file       Family History   Problem Relation Name Age of Onset    Diabetes Mother's Brother         Allergies   Allergen Reactions    Amoxicillin Hives    Hydrocodone Nausea And Vomiting    Sulfamethoxazole-Trimethoprim Other     As a baby potentially hives unsure     NAUSEA    Amoxicillin-Pot Clavulanate Rash       Medication Documentation Review Audit       Reviewed by Krystina Peoples MA (Medical Assistant) on 11/29/23 at 1439      Medication Order Taking? Sig Documenting Provider Last Dose Status   montelukast (Singulair) 10 mg tablet [id number] No Take 1 tablet (10 mg) by mouth once daily. Historical Provider, MD Not Taking Active   predniSONE (Deltasone) 10 mg tablet [id number] No 30mg PO Qday for 3 days, 20mg PO Qday for 3 days, 10 mg PO Qday for 4 days   Patient not taking: Reported on 11/29/2023    Hilary Thibodeaux, APRN-CNP Not Taking Active                     Review of Systems   Negative for constitutional, eyes, cardiac, pulmonary, hepatic, renal, digestive, hematologic, epileptic, syncopal, musculoskeletal, mental health, integumentary, hypertensive, lipid, arthritic, diabetic, thyroid or neurologic disorders (except as listed in the HPI, PMH and Problem List).       Assessment   Aretha Nuñez is a 28 y.o. year old female with symptoms of nasal obstruction, hyposmia and clinical findings consistent with chronic rhinosinusitis with early nasal polyposis and a left nasal septal deviation.  11/1/23 -CM- Rec. Treatment with prednisone and complete oral antibiotic course. Surgical referral to Dr. Suraj Fitzpatrick.  11/29/23 - Patient reports sinus symptoms that started about 1.5 years ago that include nasal obstruction, generalized fatigue, facial pain/pressure, reduced smell/taste, and nasal drainage. She has failed maximal medical therapy including greater than 3 months of topical steroid sprays and greater than 2 weeks of antibiotics. She is a surgical candidate as noted below. Patient provided consent to proceed with surgery.    Plan   I personally reviewed the patients CT scan images and results. I discussed the results personally with the patient. The following findings were discussed: 10/17/23: CT Sinus: Left nasal septal deviation. Near complete opacification of the ethmoid sinuses bilaterally. Moderate mucosal thickening of the bilateral maxillary sinuses. Minimal of the frontal, sphenoid sinuses. There is also obstruction of the bilateral frontoethmoid outflow tracts as the well as the sphenoethmoid recesses.    Reassurance and counseling provided to patient, and her condition was extensively discussed, including as noted below:    Patient is a good candidate for endoscopic sinus surgery. Patient has failed maximal medical therapy. Aretha Nuñez and I discussed her symptoms and clinical findings noted above in detail. We discussed options including  observation, continued medical therapy, or consideration of surgical intervention. Endoscopic sinus surgery itself was discussed at length. The risks, benefits, complications, and alternatives were explained in detail including but not limited to persistence of symptoms, anesthesia, pain, changes in or loss of smell, nasal obstruction, septal perforation, bleeding, infection, need for nasal packing, double vision, vision loss, CSF leak requiring other procedures to repair, numbness of teeth/and or face, need for revision surgery, injury to surrounding tissue, and unexpected risks.  Patient is a candidate for  bilateral ESS (total), septoplasty, and bilateral inferior turbinate outfracture, IGS.  The patient expressed understanding of these risks and provided informed consent. An information sheet via the medical record was provided to the patient, which included the rationale for surgery, risks, and expected postoperative care. Patient will be contacted by surgical schedulers.    Patient may continue the steroid nasal spray(s) for relief of symptoms until surgery.  Patient will follow up in clinic after surgery.    Referring Provider: Hilary Thibodeaux CNP  I will provide a report to the referring provider via the electronic medical record or US mail.    Suraj Fitzpatrick MD [facility]  Division of Rhinology, Sinus, and Skull Base Surgery       Exam   CONSTITUTIONAL: Vital signs reviewed. Patient appears well developed and well nourished.   GENERAL: this is a healthy appearing female who appears stated age. The patient is alert and appropriately verbally conversant without hoarseness. This patient is in no apparent distress.   FACE: The face was inspected and no cutaneous masses or lesions were visualized. There was no erythema or edema noted. Facial movement was symmetric. No skin lesions were detected. There was no sinus tenderness elicited. TMJ crepitus present.   EYES: Extra-ocular muscle function was intact. No  nystagmus was observed. Pupils were equal.   CRANIAL NERVES: Cranial nerves II, III, IV, and VI were noted to be intact via extra-ocular muscle movement testing. Cranial nerve VII noted to be intact and symmetric by facial movement. Cranial nerves IX and X noted to be intact by gag reflex and palatal movement. Cranial nerve XII noted to be intact by active and symmetric tongue movement.  RESPIRATORY: Normal inspiration and expiration and chest wall expansion, no use of accessory muscles to breathe, no stridor.  NEUROLOGICAL: Patient is ambulatory without assist. Mentation is clear. Answering questions appropriately.    NOSE: Examination of the nose revealed the nasal dorsum to be midline. Intranasal exam reveals the septum is deviated left. The inferior turbinates were only mildly edematous today. No masses, polyps, mucopus, or other lesion on anterior rhinoscopy. See below procedure note as applicable for further exam.    Procedure Note:  Procedure: Nasal endoscopy - diagnostic  Indication: concern for chronic sinusitis  Informed consent obtained: risks, benefits, alternatives, and expectations discussed with patient and the patient wishes to proceed.    Findings: After topical decongestion with decongestant and anesthetic spray, nasal endoscopy was performed using an endoscope. The septum was deviated to the left. The inferior turbinates were only mildly edematous today. The middle turbinates appeared edematous with polypoid changes, the middle meatus is with thick grey to mucoid drainage. There is also chronic appearing thick mucous from the sphenoethmoid recesses on the right and left. The nasopharynx was clear. There were no complications and the patient tolerated the procedure well.    Scribe Attestation  By signing my name below, I, Lucio Gardner, attest that this documentation has been prepared under the direction and in the presence of Suraj Fitzpatrick MD. All medical record entries made by the  Scribe were at my direction or personally dictated by me. I have reviewed the chart and agree that the record accurately reflects my personal performance of the history, physical exam, discussion and plan.

## 2023-11-29 ENCOUNTER — OFFICE VISIT (OUTPATIENT)
Dept: OTOLARYNGOLOGY | Facility: CLINIC | Age: 28
End: 2023-11-29
Payer: COMMERCIAL

## 2023-11-29 VITALS
WEIGHT: 112 LBS | HEART RATE: 70 BPM | BODY MASS INDEX: 20.49 KG/M2 | DIASTOLIC BLOOD PRESSURE: 82 MMHG | SYSTOLIC BLOOD PRESSURE: 121 MMHG

## 2023-11-29 DIAGNOSIS — R44.8 FACIAL PRESSURE: ICD-10-CM

## 2023-11-29 DIAGNOSIS — J34.3 HYPERTROPHY OF INFERIOR NASAL TURBINATE: ICD-10-CM

## 2023-11-29 DIAGNOSIS — J34.2 DEVIATED NASAL SEPTUM: ICD-10-CM

## 2023-11-29 DIAGNOSIS — J34.89 NASAL OBSTRUCTION: ICD-10-CM

## 2023-11-29 DIAGNOSIS — J32.4 CHRONIC PANSINUSITIS: Primary | ICD-10-CM

## 2023-11-29 DIAGNOSIS — J34.89 NASAL DRAINAGE: ICD-10-CM

## 2023-11-29 DIAGNOSIS — J32.2 CHRONIC ETHMOIDAL SINUSITIS: ICD-10-CM

## 2023-11-29 PROCEDURE — 1036F TOBACCO NON-USER: CPT | Performed by: OTOLARYNGOLOGY

## 2023-11-29 PROCEDURE — 99214 OFFICE O/P EST MOD 30 MIN: CPT | Performed by: OTOLARYNGOLOGY

## 2023-11-29 PROCEDURE — 31231 NASAL ENDOSCOPY DX: CPT | Performed by: OTOLARYNGOLOGY

## 2023-11-30 DIAGNOSIS — J34.89 NASAL OBSTRUCTION: ICD-10-CM

## 2023-11-30 DIAGNOSIS — J32.4 CHRONIC PANSINUSITIS: ICD-10-CM

## 2023-11-30 DIAGNOSIS — J34.89 NASAL DRAINAGE: ICD-10-CM

## 2023-11-30 DIAGNOSIS — J32.1 CHRONIC FRONTAL SINUSITIS: ICD-10-CM

## 2023-11-30 DIAGNOSIS — J32.3 CHRONIC SPHENOIDAL SINUSITIS: ICD-10-CM

## 2023-11-30 DIAGNOSIS — J34.2 DEVIATED NASAL SEPTUM: ICD-10-CM

## 2023-12-07 ENCOUNTER — TELEPHONE (OUTPATIENT)
Dept: OTOLARYNGOLOGY | Facility: CLINIC | Age: 28
End: 2023-12-07
Payer: COMMERCIAL

## 2023-12-07 DIAGNOSIS — J32.4 CHRONIC PANSINUSITIS: ICD-10-CM

## 2023-12-07 PROBLEM — J34.89 NASAL OBSTRUCTION: Status: ACTIVE | Noted: 2023-11-30

## 2023-12-07 PROBLEM — J34.89 NASAL DRAINAGE: Status: ACTIVE | Noted: 2023-11-30

## 2023-12-07 PROBLEM — J34.2 DEVIATED NASAL SEPTUM: Status: ACTIVE | Noted: 2023-11-30

## 2023-12-07 RX ORDER — PREDNISONE 10 MG/1
TABLET ORAL
Qty: 30 TABLET | Refills: 0 | Status: SHIPPED | OUTPATIENT
Start: 2023-12-07 | End: 2024-01-03 | Stop reason: HOSPADM

## 2023-12-07 RX ORDER — DOXYCYCLINE 100 MG/1
100 TABLET ORAL 2 TIMES DAILY
Qty: 20 TABLET | Refills: 0 | Status: SHIPPED | OUTPATIENT
Start: 2023-12-07 | End: 2023-12-17

## 2023-12-07 NOTE — TELEPHONE ENCOUNTER
Patient scheduled for surgery 1/3/24. Patient called the office today with concerns of an increase in sinus symptoms. Current symptoms of nasal congestion, cannot breathe out of her nose, bilateral ear pain, facial pain and discolored nasal drainage. Patient says the discolored drainage is baseline for her but the ear/facial pain has been going on for the last 4 days. Patient reports she is unable to get her nasal sprays or irrigations into her nose due to her feeling so blocked up and congested. Patient reports does take an allergy pill daily. Discussed with Dr. Fitzpatrick who gave a verbal order for Doxycycline 100mg BID for 10 days as well as Prednisone taper. Medication education provided to patient, advised patient to take medications after meals, avoid dairy products 2 hours before and after administration, and encouraged a daily probiotic while on antibiotic. Patient educated on antibiotic causing sunlight sensitivity to skin and encouraged SPF use. Steroid side effects reviewed with patient who wishes to proceed with steroid course at this time. Advised patient to discontinue medications if adverse effects. Patient agrees to plan of care and prescription sent to pharmacy.

## 2023-12-13 NOTE — H&P (VIEW-ONLY)
CPM/PAT Evaluation       Name: Aretha Nuñez (Aretha Nuñez)  /Age:  y.o.     TELEMEDICINE ENCOUNTER  Patient was contacted by telephone for preadmission testing perioperative risk assessment prior to surgery.    CHIEF COMPLAINT  Chronic pansinusitis, nasal obstruction    HPI  Patient is a 28-year-old female complaining of a 1.5-year history of sinus and nasal problems.  Patient states she has nasal drainage, facial pain/pressure, nasal obstructive breathing which is worse when laying down.  She reports it negatively impacts her quality of sleep, and exercise routine. Patient has taken antibiotics in the past for recurrent sinus infections, and is currently taking doxycycline and prednisone.  Patient has tried using nasal saline irrigations, nasal sprays, and oral antihistamines.    Patient would like to have theses chronic symptoms addressed through surgery, and is scheduled for bilateral endoscopic sinus surgery, septoplasty, inferior turbinate outfracture with IGS on 2024 at [facility].     ACTIVE PROBLEMS  Patient Active Problem List   Diagnosis    Allergic rhinitis    Congenital hypoplasia of breast    Vertigo    Subacute cough    Allergic reaction    Lightheadedness    Chronic sinusitis    Deviated nasal septum    Nasal obstruction    Nasal drainage     PAST MEDICAL HISTORY  History reviewed. No pertinent past medical history.    SURGICAL HISTORY  Past Surgical History:   Procedure Laterality Date    [place] BREAST AUGMENTATION WITH IMPLANT      Saline implants    WISDOM TOOTH EXTRACTION       ANESTHESIA HISTORY  Denies problems with anesthesia in the past such as PONV, prolonged sedation, awareness, dental damage, aspiration, cardiac arrest, difficult intubation, or unexpected hospital admissions.  Denies family history of malignant hyperthermia, or pseudocholinesterase deficiency.    SOCIAL HISTORY  , mother of 2, works at [employer]  Never smoker; EtOH: 2-3 drinks a  week; denies recreational drug use  Patient states she exercises daily and is able to participate in moderate to vigorous activities.  Patient denies chest pain, POLANCO.  METS >4    FAMILY HISTORY  Family History   Problem Relation Name Age of Onset    Diabetes Mother's Brother       ALLERGIES  Allergies   Allergen Reactions    Amoxicillin Hives    Hydrocodone Nausea And Vomiting    Sulfamethoxazole-Trimethoprim Other     As a baby potentially hives unsure     NAUSEA    Amoxicillin-Pot Clavulanate Rash     MEDICATIONS  No current facility-administered medications for this encounter.    Current Outpatient Medications:     doxycycline (Adoxa) 100 mg tablet, Take 1 tablet (100 mg) by mouth 2 times a day for 10 days. Take with food twice daily. Take the full course of the medication. Be sure to wear a SPF or keep covered in the sun., Disp: 20 tablet, Rfl: 0    predniSONE (Deltasone) 10 mg tablet, 30mg PO Qday for 3 days, 20mg PO Qday for 3 days, 10 mg PO Qday for 4 days (Patient not taking: Reported on 11/29/2023), Disp: 19 tablet, Rfl: 0    predniSONE (Deltasone) 10 mg tablet, 40mg PO for 3 days, 30mg PO Qday for 3 days, 20mg PO Qday for 3 days, 10 mg PO Qday for 3 days, then stop., Disp: 30 tablet, Rfl: 0    PHYSICAL EXAM  Deferred    AIRWAY EXAM  Deferred    VITALS  No vitals taken for telemedicine visit  Height: 5 feet 2 inches; weight: 112 pounds; BMI: 20.49    LABS  Lab Results   Component Value Date    WBC 8.0 09/13/2023    HGB 14.0 09/13/2023    HCT 42.0 09/13/2023    MCV 96 09/13/2023     09/13/2023     Lab Results   Component Value Date    GLUCOSE 72 (L) 09/13/2023    CALCIUM 9.6 09/13/2023     09/13/2023    K 4.3 09/13/2023    CO2 28 09/13/2023     09/13/2023    BUN 13 09/13/2023    CREATININE 0.91 09/13/2023       ASSESSMENT/PLAN  Chronic pansinusitis, nasal obstruction, deviated nasal septum, hypertrophy of nasal turbinates  Bilateral endoscopic sinus surgery, septoplasty, inferior turbinate  outfracture's with IGS      This note was created in part upon personal review of patient's medical records.

## 2023-12-13 NOTE — PREPROCEDURE INSTRUCTIONS
Pre-Op Instructions & Checklist  Your surgery has been scheduled at [facility] at [address] [place], [address], [address], in the [facility]. Parking is to the left of the main entrance.  You will be contacted about the time of your surgery the day before your surgery. If you are unable to answer the phone, a detailed voicemail message will be left. Make sure that your voicemail box is not full so a message can be left. If you have not received a call by 3:00 pm you may call [phone] between the hours of 3:00 and 4:00 pm. Please be available by phone the night before/day of surgery in case there is a change in the schedule which may require you to arrive earlier/later.  14 DAYS BEFORE SURGERY STOP TAKING WEIGHT LOSS MEDICATIONS     7 DAYS BEFORE SURGERY STOP THESE MEDICATIONS:  Multiple Vitamins containing Vitamin E  Herbal supplements, Fish Oil, garlic pills, turmeric  Stop taking aspirin, and aspirin-containing products as well as NSAID's such as Advil, Motrin, Aleve, Ibuprofen. Tylenol is okay to take for pain relief.   If you are currently taking Coumadin/Warfarin, we will have to coordinate that with your PCP &/or the Anticoagulation Clinic.       THE DAY BEFORE SURGERY:  *Do not eat any food after midnight the night before surgery.   *You are permitted to have clear liquids such as water, apple juice, plain tea or coffee (no milk or creamer), clear electrolyte-replenishing drinks such as Pedialyte, Gatorade, or Powerade (not yogurt or pulp-containing smoothies or juices such as orange juice) up to 2 hours before your surgery.    DAY OF SURGERY, TAKE THESE MEDICATIONS with a small sip of water (if it is not listed, do not take it):    There are no medications for you to take the morning of surgery.     ON THE MORNING OF SURGERY:  *Shower either the night before your surgery or the morning of your surgery  *Do not use moisturizers, creams, lotions or perfume, or  make-up.  *Wear comfortable, loose fitting clothing.   *All jewelry and valuables should be left at home.  *Prosthetic devices such as contact lenses, hearing aids, dentures, eyelash extensions, hairpins and body piercing must be removed before surgery. Bring containers for eyeglasses/contacts, dentures, or hearing aids with you.  Diabetics: Please check fasting blood sugars upon waking up.  If fasting blood sugars are<80ml/dl, please drink 3 ounces of apple juice no later than 2 hours prior to surgery.    BRING WITH YOU:  *Photo ID and insurance card  *Current list of medicines and allergies  *Pacemaker/Defibrillator/Heart stent cards  *Copy of your complete Advanced Directive/DHPOA-if applicable      SMOKING:  *Quitting smoking can make a huge difference to your health and recovery from surgery.    *If you need help with quitting, call [phone]-QUIT-NOW.    Alcohol:  *No alcoholic beverages for 48 hours before surgery.    AFTER OUTPATIENT SURGERY:  *A responsible adult MUST accompany you at the time of discharge and stay with you for 24 hours after your surgery.  *You may NOT drive yourself home after surgery.  *You may use a taxi or ride sharing service (Lyft, Uber) to return home ONLY if you are accompanied by a friend or family member.  *Instructions for resuming your medications will be provided by your surgeon.    CONTACT SURGEON'S OFFICE IF YOU DEVELOP:  * Fever =/> 100.4 F   * New respiratory symptoms (e.g. cough, shortness of breath, respiratory distress, sore throat)  * Recent loss of taste or smell  *Flu like symptoms such as headache, fatigue or gastrointestinal symptoms  * If you develop any open sores, shingles, burning or painful urination   AND/OR:  * You no longer wish to have the surgery.  * Any other personal circumstances change that may lead to the need to cancel or defer this surgery.  *You were admitted to any hospital within one week of your planned procedure.      If you have any questions  regarding these preoperative instructions you may call [phone]. If you have questions regarding you surgical procedure, or post-operative care/recovery please call your surgeon's office.     Link to [facility] [employer]  [email or web address]

## 2023-12-13 NOTE — CPM/PAT H&P
CPM/PAT Evaluation       Name: Aretha Nuñez (Aretha Nuñez)  /Age:  y.o.     TELEMEDICINE ENCOUNTER  Patient was contacted by telephone for preadmission testing perioperative risk assessment prior to surgery.    CHIEF COMPLAINT  Chronic pansinusitis, nasal obstruction    HPI  Patient is a 28-year-old female complaining of a 1.5-year history of sinus and nasal problems.  Patient states she has nasal drainage, facial pain/pressure, nasal obstructive breathing which is worse when laying down.  She reports it negatively impacts her quality of sleep, and exercise routine. Patient has taken antibiotics in the past for recurrent sinus infections, and is currently taking doxycycline and prednisone.  Patient has tried using nasal saline irrigations, nasal sprays, and oral antihistamines.    Patient would like to have theses chronic symptoms addressed through surgery, and is scheduled for bilateral endoscopic sinus surgery, septoplasty, inferior turbinate outfracture with IGS on 2024 at [facility].     ACTIVE PROBLEMS  Patient Active Problem List   Diagnosis    Allergic rhinitis    Congenital hypoplasia of breast    Vertigo    Subacute cough    Allergic reaction    Lightheadedness    Chronic sinusitis    Deviated nasal septum    Nasal obstruction    Nasal drainage     PAST MEDICAL HISTORY  History reviewed. No pertinent past medical history.    SURGICAL HISTORY  Past Surgical History:   Procedure Laterality Date    [place] BREAST AUGMENTATION WITH IMPLANT      Saline implants    WISDOM TOOTH EXTRACTION       ANESTHESIA HISTORY  Denies problems with anesthesia in the past such as PONV, prolonged sedation, awareness, dental damage, aspiration, cardiac arrest, difficult intubation, or unexpected hospital admissions.  Denies family history of malignant hyperthermia, or pseudocholinesterase deficiency.    SOCIAL HISTORY  , mother of 2, works at [employer]  Never smoker; EtOH: 2-3 drinks a  week; denies recreational drug use  Patient states she exercises daily and is able to participate in moderate to vigorous activities.  Patient denies chest pain, POLANCO.  METS >4    FAMILY HISTORY  Family History   Problem Relation Name Age of Onset    Diabetes Mother's Brother       ALLERGIES  Allergies   Allergen Reactions    Amoxicillin Hives    Hydrocodone Nausea And Vomiting    Sulfamethoxazole-Trimethoprim Other     As a baby potentially hives unsure     NAUSEA    Amoxicillin-Pot Clavulanate Rash     MEDICATIONS  No current facility-administered medications for this encounter.    Current Outpatient Medications:     doxycycline (Adoxa) 100 mg tablet, Take 1 tablet (100 mg) by mouth 2 times a day for 10 days. Take with food twice daily. Take the full course of the medication. Be sure to wear a SPF or keep covered in the sun., Disp: 20 tablet, Rfl: 0    predniSONE (Deltasone) 10 mg tablet, 30mg PO Qday for 3 days, 20mg PO Qday for 3 days, 10 mg PO Qday for 4 days (Patient not taking: Reported on 11/29/2023), Disp: 19 tablet, Rfl: 0    predniSONE (Deltasone) 10 mg tablet, 40mg PO for 3 days, 30mg PO Qday for 3 days, 20mg PO Qday for 3 days, 10 mg PO Qday for 3 days, then stop., Disp: 30 tablet, Rfl: 0    PHYSICAL EXAM  Deferred    AIRWAY EXAM  Deferred    VITALS  No vitals taken for telemedicine visit  Height: 5 feet 2 inches; weight: 112 pounds; BMI: 20.49    LABS  Lab Results   Component Value Date    WBC 8.0 09/13/2023    HGB 14.0 09/13/2023    HCT 42.0 09/13/2023    MCV 96 09/13/2023     09/13/2023     Lab Results   Component Value Date    GLUCOSE 72 (L) 09/13/2023    CALCIUM 9.6 09/13/2023     09/13/2023    K 4.3 09/13/2023    CO2 28 09/13/2023     09/13/2023    BUN 13 09/13/2023    CREATININE 0.91 09/13/2023       ASSESSMENT/PLAN  Chronic pansinusitis, nasal obstruction, deviated nasal septum, hypertrophy of nasal turbinates  Bilateral endoscopic sinus surgery, septoplasty, inferior turbinate  outfracture's with IGS      This note was created in part upon personal review of patient's medical records.

## 2024-01-02 ENCOUNTER — ANESTHESIA EVENT (OUTPATIENT)
Dept: OPERATING ROOM | Facility: CLINIC | Age: 29
End: 2024-01-02
Payer: COMMERCIAL

## 2024-01-03 ENCOUNTER — ANESTHESIA (OUTPATIENT)
Dept: OPERATING ROOM | Facility: CLINIC | Age: 29
End: 2024-01-03
Payer: COMMERCIAL

## 2024-01-03 ENCOUNTER — HOSPITAL ENCOUNTER (OUTPATIENT)
Facility: CLINIC | Age: 29
Setting detail: OUTPATIENT SURGERY
Discharge: HOME | End: 2024-01-03
Attending: OTOLARYNGOLOGY | Admitting: OTOLARYNGOLOGY
Payer: COMMERCIAL

## 2024-01-03 VITALS
BODY MASS INDEX: 20.16 KG/M2 | HEART RATE: 93 BPM | SYSTOLIC BLOOD PRESSURE: 120 MMHG | HEIGHT: 63 IN | TEMPERATURE: 98.8 F | OXYGEN SATURATION: 100 % | WEIGHT: 113.76 LBS | RESPIRATION RATE: 16 BRPM | DIASTOLIC BLOOD PRESSURE: 79 MMHG

## 2024-01-03 DIAGNOSIS — J32.1 CHRONIC FRONTAL SINUSITIS: ICD-10-CM

## 2024-01-03 DIAGNOSIS — J32.3 CHRONIC SPHENOIDAL SINUSITIS: ICD-10-CM

## 2024-01-03 DIAGNOSIS — J34.89 NASAL OBSTRUCTION: ICD-10-CM

## 2024-01-03 DIAGNOSIS — J34.2 DEVIATED NASAL SEPTUM: ICD-10-CM

## 2024-01-03 DIAGNOSIS — J32.4 CHRONIC PANSINUSITIS: ICD-10-CM

## 2024-01-03 DIAGNOSIS — J34.89 NASAL DRAINAGE: ICD-10-CM

## 2024-01-03 LAB — PREGNANCY TEST URINE, POC: NEGATIVE

## 2024-01-03 PROCEDURE — 31257 NSL/SINS NDSC TOT W/SPHENDT: CPT | Performed by: OTOLARYNGOLOGY

## 2024-01-03 PROCEDURE — 30930 THER FX NASAL INF TURBINATE: CPT | Performed by: OTOLARYNGOLOGY

## 2024-01-03 PROCEDURE — 2500000002 HC RX 250 W HCPCS SELF ADMINISTERED DRUGS (ALT 637 FOR MEDICARE OP, ALT 636 FOR OP/ED): Performed by: ANESTHESIOLOGY

## 2024-01-03 PROCEDURE — 3700000002 HC GENERAL ANESTHESIA TIME - EACH INCREMENTAL 1 MINUTE: Performed by: OTOLARYNGOLOGY

## 2024-01-03 PROCEDURE — 2500000004 HC RX 250 GENERAL PHARMACY W/ HCPCS (ALT 636 FOR OP/ED): Performed by: OTOLARYNGOLOGY

## 2024-01-03 PROCEDURE — 2500000005 HC RX 250 GENERAL PHARMACY W/O HCPCS: Performed by: OTOLARYNGOLOGY

## 2024-01-03 PROCEDURE — 31267 ENDOSCOPY MAXILLARY SINUS: CPT | Performed by: OTOLARYNGOLOGY

## 2024-01-03 PROCEDURE — 3600000008 HC OR TIME - EACH INCREMENTAL 1 MINUTE - PROCEDURE LEVEL THREE: Performed by: OTOLARYNGOLOGY

## 2024-01-03 PROCEDURE — A4217 STERILE WATER/SALINE, 500 ML: HCPCS | Performed by: OTOLARYNGOLOGY

## 2024-01-03 PROCEDURE — 2500000001 HC RX 250 WO HCPCS SELF ADMINISTERED DRUGS (ALT 637 FOR MEDICARE OP): Performed by: OTOLARYNGOLOGY

## 2024-01-03 PROCEDURE — 7100000009 HC PHASE TWO TIME - INITIAL BASE CHARGE: Performed by: OTOLARYNGOLOGY

## 2024-01-03 PROCEDURE — 7100000001 HC RECOVERY ROOM TIME - INITIAL BASE CHARGE: Performed by: OTOLARYNGOLOGY

## 2024-01-03 PROCEDURE — A30520 PR REPAIR OF NASAL SEPTUM: Performed by: ANESTHESIOLOGY

## 2024-01-03 PROCEDURE — 31276 NSL/SINS NDSC FRNT TISS RMVL: CPT | Performed by: OTOLARYNGOLOGY

## 2024-01-03 PROCEDURE — 61782 SCAN PROC CRANIAL EXTRA: CPT | Performed by: OTOLARYNGOLOGY

## 2024-01-03 PROCEDURE — 7100000002 HC RECOVERY ROOM TIME - EACH INCREMENTAL 1 MINUTE: Performed by: OTOLARYNGOLOGY

## 2024-01-03 PROCEDURE — 2500000001 HC RX 250 WO HCPCS SELF ADMINISTERED DRUGS (ALT 637 FOR MEDICARE OP): Performed by: STUDENT IN AN ORGANIZED HEALTH CARE EDUCATION/TRAINING PROGRAM

## 2024-01-03 PROCEDURE — 2500000004 HC RX 250 GENERAL PHARMACY W/ HCPCS (ALT 636 FOR OP/ED): Performed by: STUDENT IN AN ORGANIZED HEALTH CARE EDUCATION/TRAINING PROGRAM

## 2024-01-03 PROCEDURE — 3600000003 HC OR TIME - INITIAL BASE CHARGE - PROCEDURE LEVEL THREE: Performed by: OTOLARYNGOLOGY

## 2024-01-03 PROCEDURE — 88305 TISSUE EXAM BY PATHOLOGIST: CPT | Performed by: PATHOLOGY

## 2024-01-03 PROCEDURE — 2720000007 HC OR 272 NO HCPCS: Performed by: OTOLARYNGOLOGY

## 2024-01-03 PROCEDURE — 2500000005 HC RX 250 GENERAL PHARMACY W/O HCPCS: Performed by: NURSE ANESTHETIST, CERTIFIED REGISTERED

## 2024-01-03 PROCEDURE — 81025 URINE PREGNANCY TEST: CPT | Performed by: OTOLARYNGOLOGY

## 2024-01-03 PROCEDURE — 88305 TISSUE EXAM BY PATHOLOGIST: CPT | Mod: TC | Performed by: OTOLARYNGOLOGY

## 2024-01-03 PROCEDURE — 30520 REPAIR OF NASAL SEPTUM: CPT | Performed by: OTOLARYNGOLOGY

## 2024-01-03 PROCEDURE — 3700000001 HC GENERAL ANESTHESIA TIME - INITIAL BASE CHARGE: Performed by: OTOLARYNGOLOGY

## 2024-01-03 PROCEDURE — 88311 DECALCIFY TISSUE: CPT | Performed by: PATHOLOGY

## 2024-01-03 PROCEDURE — 2500000004 HC RX 250 GENERAL PHARMACY W/ HCPCS (ALT 636 FOR OP/ED): Performed by: NURSE ANESTHETIST, CERTIFIED REGISTERED

## 2024-01-03 PROCEDURE — A30520 PR REPAIR OF NASAL SEPTUM: Performed by: NURSE ANESTHETIST, CERTIFIED REGISTERED

## 2024-01-03 PROCEDURE — 7100000010 HC PHASE TWO TIME - EACH INCREMENTAL 1 MINUTE: Performed by: OTOLARYNGOLOGY

## 2024-01-03 PROCEDURE — 88304 TISSUE EXAM BY PATHOLOGIST: CPT | Performed by: PATHOLOGY

## 2024-01-03 RX ORDER — LIDOCAINE HYDROCHLORIDE AND EPINEPHRINE 10; 10 MG/ML; UG/ML
INJECTION, SOLUTION INFILTRATION; PERINEURAL AS NEEDED
Status: DISCONTINUED | OUTPATIENT
Start: 2024-01-03 | End: 2024-01-03 | Stop reason: HOSPADM

## 2024-01-03 RX ORDER — ACETAMINOPHEN 325 MG/1
650 TABLET ORAL EVERY 4 HOURS PRN
Status: DISCONTINUED | OUTPATIENT
Start: 2024-01-03 | End: 2024-01-03 | Stop reason: HOSPADM

## 2024-01-03 RX ORDER — APREPITANT 40 MG/1
40 CAPSULE ORAL DAILY
Status: DISCONTINUED | OUTPATIENT
Start: 2024-01-03 | End: 2024-01-03 | Stop reason: HOSPADM

## 2024-01-03 RX ORDER — SODIUM CHLORIDE, SODIUM LACTATE, POTASSIUM CHLORIDE, CALCIUM CHLORIDE 600; 310; 30; 20 MG/100ML; MG/100ML; MG/100ML; MG/100ML
100 INJECTION, SOLUTION INTRAVENOUS CONTINUOUS
Status: DISCONTINUED | OUTPATIENT
Start: 2024-01-03 | End: 2024-01-03 | Stop reason: HOSPADM

## 2024-01-03 RX ORDER — OXYCODONE HYDROCHLORIDE 10 MG/1
10 TABLET ORAL EVERY 4 HOURS PRN
Status: DISCONTINUED | OUTPATIENT
Start: 2024-01-03 | End: 2024-01-03 | Stop reason: HOSPADM

## 2024-01-03 RX ORDER — SODIUM CHLORIDE 0.9 G/100ML
IRRIGANT IRRIGATION AS NEEDED
Status: DISCONTINUED | OUTPATIENT
Start: 2024-01-03 | End: 2024-01-03 | Stop reason: HOSPADM

## 2024-01-03 RX ORDER — FENTANYL CITRATE 50 UG/ML
INJECTION, SOLUTION INTRAMUSCULAR; INTRAVENOUS AS NEEDED
Status: DISCONTINUED | OUTPATIENT
Start: 2024-01-03 | End: 2024-01-03

## 2024-01-03 RX ORDER — OXYMETAZOLINE HCL 0.05 %
SPRAY, NON-AEROSOL (ML) NASAL AS NEEDED
Status: DISCONTINUED | OUTPATIENT
Start: 2024-01-03 | End: 2024-01-03 | Stop reason: HOSPADM

## 2024-01-03 RX ORDER — ROCURONIUM BROMIDE 10 MG/ML
INJECTION, SOLUTION INTRAVENOUS AS NEEDED
Status: DISCONTINUED | OUTPATIENT
Start: 2024-01-03 | End: 2024-01-03

## 2024-01-03 RX ORDER — ESMOLOL HYDROCHLORIDE 10 MG/ML
INJECTION INTRAVENOUS AS NEEDED
Status: DISCONTINUED | OUTPATIENT
Start: 2024-01-03 | End: 2024-01-03

## 2024-01-03 RX ORDER — MIDAZOLAM HYDROCHLORIDE 1 MG/ML
INJECTION, SOLUTION INTRAMUSCULAR; INTRAVENOUS AS NEEDED
Status: DISCONTINUED | OUTPATIENT
Start: 2024-01-03 | End: 2024-01-03

## 2024-01-03 RX ORDER — LIDOCAINE IN NACL,ISO-OSMOT/PF 30 MG/3 ML
0.1 SYRINGE (ML) INJECTION ONCE
Status: DISCONTINUED | OUTPATIENT
Start: 2024-01-03 | End: 2024-01-03 | Stop reason: HOSPADM

## 2024-01-03 RX ORDER — ONDANSETRON HYDROCHLORIDE 2 MG/ML
INJECTION, SOLUTION INTRAVENOUS AS NEEDED
Status: DISCONTINUED | OUTPATIENT
Start: 2024-01-03 | End: 2024-01-03

## 2024-01-03 RX ORDER — PROPOFOL 10 MG/ML
INJECTION, EMULSION INTRAVENOUS AS NEEDED
Status: DISCONTINUED | OUTPATIENT
Start: 2024-01-03 | End: 2024-01-03

## 2024-01-03 RX ORDER — CEFAZOLIN 1 G/1
INJECTION, POWDER, FOR SOLUTION INTRAVENOUS AS NEEDED
Status: DISCONTINUED | OUTPATIENT
Start: 2024-01-03 | End: 2024-01-03

## 2024-01-03 RX ORDER — DOXYCYCLINE 100 MG/1
100 CAPSULE ORAL 2 TIMES DAILY
Qty: 20 CAPSULE | Refills: 0 | Status: SHIPPED | OUTPATIENT
Start: 2024-01-03 | End: 2024-01-13

## 2024-01-03 RX ORDER — FENTANYL CITRATE 50 UG/ML
25 INJECTION, SOLUTION INTRAMUSCULAR; INTRAVENOUS EVERY 5 MIN PRN
Status: DISCONTINUED | OUTPATIENT
Start: 2024-01-03 | End: 2024-01-03 | Stop reason: HOSPADM

## 2024-01-03 RX ORDER — DEXAMETHASONE SODIUM PHOSPHATE 100 MG/10ML
INJECTION INTRAMUSCULAR; INTRAVENOUS AS NEEDED
Status: DISCONTINUED | OUTPATIENT
Start: 2024-01-03 | End: 2024-01-03

## 2024-01-03 RX ORDER — OXYCODONE HYDROCHLORIDE 5 MG/1
5 TABLET ORAL EVERY 4 HOURS PRN
Status: DISCONTINUED | OUTPATIENT
Start: 2024-01-03 | End: 2024-01-03 | Stop reason: HOSPADM

## 2024-01-03 RX ORDER — TRAMADOL HYDROCHLORIDE 50 MG/1
50 TABLET ORAL EVERY 6 HOURS PRN
Qty: 15 TABLET | Refills: 0 | Status: SHIPPED | OUTPATIENT
Start: 2024-01-03 | End: 2024-03-04 | Stop reason: WASHOUT

## 2024-01-03 RX ADMIN — SODIUM CHLORIDE, SODIUM LACTATE, POTASSIUM CHLORIDE, AND CALCIUM CHLORIDE: .6; .31; .03; .02 INJECTION, SOLUTION INTRAVENOUS at 10:55

## 2024-01-03 RX ADMIN — SODIUM CHLORIDE 0.05 MCG/KG/MIN: 9 INJECTION, SOLUTION INTRAVENOUS at 11:19

## 2024-01-03 RX ADMIN — FENTANYL CITRATE 100 MCG: 50 INJECTION, SOLUTION INTRAMUSCULAR; INTRAVENOUS at 11:12

## 2024-01-03 RX ADMIN — DEXAMETHASONE SODIUM PHOSPHATE 10 MG: 10 INJECTION INTRAMUSCULAR; INTRAVENOUS at 11:22

## 2024-01-03 RX ADMIN — ROCURONIUM BROMIDE 70 MG: 10 INJECTION, SOLUTION INTRAVENOUS at 11:12

## 2024-01-03 RX ADMIN — CEFAZOLIN 2 G: 1 INJECTION, POWDER, FOR SOLUTION INTRAMUSCULAR; INTRAVENOUS at 11:22

## 2024-01-03 RX ADMIN — SODIUM CHLORIDE, SODIUM LACTATE, POTASSIUM CHLORIDE, AND CALCIUM CHLORIDE: .6; .31; .03; .02 INJECTION, SOLUTION INTRAVENOUS at 13:48

## 2024-01-03 RX ADMIN — PROPOFOL 200 MG: 10 INJECTION, EMULSION INTRAVENOUS at 11:12

## 2024-01-03 RX ADMIN — ESMOLOL HYDROCHLORIDE 10 MG: 100 INJECTION, SOLUTION INTRAVENOUS at 11:39

## 2024-01-03 RX ADMIN — MIDAZOLAM 2 MG: 1 INJECTION INTRAMUSCULAR; INTRAVENOUS at 11:08

## 2024-01-03 RX ADMIN — ONDANSETRON 4 MG: 2 INJECTION INTRAMUSCULAR; INTRAVENOUS at 13:47

## 2024-01-03 RX ADMIN — SUGAMMADEX 150 MG: 100 INJECTION, SOLUTION INTRAVENOUS at 14:03

## 2024-01-03 RX ADMIN — OXYCODONE HYDROCHLORIDE 5 MG: 5 TABLET ORAL at 14:20

## 2024-01-03 RX ADMIN — APREPITANT 40 MG: 40 CAPSULE ORAL at 10:30

## 2024-01-03 RX ADMIN — SODIUM CHLORIDE, POTASSIUM CHLORIDE, SODIUM LACTATE AND CALCIUM CHLORIDE 100 ML/HR: 600; 310; 30; 20 INJECTION, SOLUTION INTRAVENOUS at 10:16

## 2024-01-03 RX ADMIN — ESMOLOL HYDROCHLORIDE 10 MG: 100 INJECTION, SOLUTION INTRAVENOUS at 11:36

## 2024-01-03 SDOH — HEALTH STABILITY: MENTAL HEALTH: CURRENT SMOKER: 0

## 2024-01-03 ASSESSMENT — PAIN SCALES - GENERAL
PAINLEVEL_OUTOF10: 4
PAINLEVEL_OUTOF10: 0 - NO PAIN
PAINLEVEL_OUTOF10: 4

## 2024-01-03 ASSESSMENT — PAIN - FUNCTIONAL ASSESSMENT
PAIN_FUNCTIONAL_ASSESSMENT: 0-10

## 2024-01-03 ASSESSMENT — COLUMBIA-SUICIDE SEVERITY RATING SCALE - C-SSRS
1. IN THE PAST MONTH, HAVE YOU WISHED YOU WERE DEAD OR WISHED YOU COULD GO TO SLEEP AND NOT WAKE UP?: NO
2. HAVE YOU ACTUALLY HAD ANY THOUGHTS OF KILLING YOURSELF?: NO
6. HAVE YOU EVER DONE ANYTHING, STARTED TO DO ANYTHING, OR PREPARED TO DO ANYTHING TO END YOUR LIFE?: NO

## 2024-01-03 NOTE — ANESTHESIA PREPROCEDURE EVALUATION
Patient: Aretha Nuñez    Procedure Information       Date/Time: 01/03/24 1100    Procedures:       Bilateral endoscopic sinus surgery, septoplasty, inferior turbinate outfractures with IGS (Bilateral)      Nasal Endoscopy with Excision Tissue Frontal Sinus (Bilateral)      Nasal Endoscopy with Excision Tissue Maxillary Sinus (Bilateral)      Repair Septum Nasal Cavity      Fracture Therapeutic Nasal Bone (Bilateral)      Navigation-Assisted Surgery    [facility]    Surgeons: Suraj Fitzpatrick MD            Relevant Problems   No relevant active problems       Clinical information reviewed:   Tobacco  Allergies  Meds   Med Hx  Surg Hx  OB Status  Fam Hx  Soc   Hx        NPO Detail:  NPO/Void Status  Date of Last Liquid: 01/02/24  Time of Last Liquid: 2030  Date of Last Solid: 01/02/24  Time of Last Solid: 2030         Physical Exam    Airway  Mallampati: I  TM distance: >3 FB  Neck ROM: full     Cardiovascular - normal exam     Dental - normal exam     Pulmonary - normal exam     Abdominal - normal exam             Anesthesia Plan    ASA 1     general     The patient is not a current smoker.  Patient was not previously instructed to abstain from smoking on day of procedure.  Patient did not smoke on day of procedure.    intravenous induction   Postoperative administration of opioids is intended.  Anesthetic plan and risks discussed with patient.  Use of blood products discussed with patient who.    Plan discussed with CRNA.

## 2024-01-03 NOTE — ANESTHESIA POSTPROCEDURE EVALUATION
Patient: Aretha Nuñez    Procedure Summary       Date: 01/03/24 [facility]    Anesthesia Start: 1056 Anesthesia Stop: 1414    Procedures:       Bilateral endoscopic sinus surgery, septoplasty, inferior turbinate outfractures with IGS (Bilateral)      Nasal Endoscopy with Excision Tissue Frontal Sinus (Bilateral)      Nasal Endoscopy with Excision Tissue Maxillary Sinus (Bilateral)      Repair Septum Nasal Cavity      Fracture Therapeutic Nasal Bone (Bilateral)      Navigation-Assisted Surgery Diagnosis:       Chronic pansinusitis      Deviated nasal septum      Nasal obstruction      Nasal drainage      Chronic frontal sinusitis      Chronic sphenoidal sinusitis      (Chronic pansinusitis [J32.4])      (Deviated nasal septum [J34.2])      (Nasal obstruction [J34.89])      (Nasal drainage [J34.89])      (Chronic frontal sinusitis [J32.1])      (Chronic sphenoidal sinusitis [J32.3])    Surgeons: Suraj Fitzpatrick MD Responsible Provider: SU Damon    Anesthesia Type: general ASA Status: 1            Anesthesia Type: general    Vitals Value Taken Time   /90 01/03/24 1426   Temp 37 °C (98.6 °F) 01/03/24 1411   Pulse 94 01/03/24 1426   Resp 16 01/03/24 1426   SpO2 100 % 01/03/24 1426       Anesthesia Post Evaluation    Patient location during evaluation: bedside  Patient participation: complete - patient participated  Level of consciousness: awake and alert  Pain management: adequate  Airway patency: patent  Cardiovascular status: acceptable and stable  Respiratory status: acceptable and room air  Hydration status: acceptable  Postoperative Nausea and Vomiting: none        No notable events documented.

## 2024-01-03 NOTE — INTERVAL H&P NOTE
H&P reviewed. The patient was examined and there are no changes to the H&P.    CV: Peripheral perfusion intact, no cyanosis, clubbing or edema  Respiratory: Good chest expansion, no stridor or stertor

## 2024-01-03 NOTE — ANESTHESIA PROCEDURE NOTES
Airway  Date/Time: 1/3/2024 11:14 AM  Urgency: elective      Staffing  Performed: CRNA   Authorized by: SU Damon    Performed by: SU Damon  Patient location during procedure: OR    Indications and Patient Condition  Indications for airway management: anesthesia  Spontaneous ventilation: present  Sedation level: deep  Preoxygenated: yes  Patient position: sniffing  MILS maintained throughout  Mask difficulty assessment: 1 - vent by mask  No planned trial extubation    Final Airway Details  Final airway type: endotracheal airway      Successful airway: ETT  Cuffed: yes   Successful intubation technique: direct laryngoscopy  Endotracheal tube insertion site: oral  Blade: Margarita  Blade size: #3  ETT size (mm): 6.5  Cormack-Lehane Classification: grade I - full view of glottis  Placement verified by: chest auscultation and capnometry   Measured from: lips  ETT to lips (cm): 21  Number of attempts at approach: 1  Number of other approaches attempted: 0    Additional Comments  Tube taped to left

## 2024-01-03 NOTE — DISCHARGE INSTRUCTIONS
Nasal and Sinus Surgery at Home Instructions  The following instructions will help you know what to expect in the days following your surgery.     Please have the following items available at your home/recovery residence:  · NeilMed Sinus Rinse® bottle or equivalent for post-surgical nasal irrigations  · Oxymetazoline (Afrin®) or Phenylephrine (Anuj-Synephrine®) are topical decongestant sprays   - they should ONLY be used if you have a nosebleed or excessive bleeding  · 4 x 4 gauze and paper tape  · Tylenol® (adjunct to prescription therapy or as sole pain medication)    Activities  · You may shower the same day as your surgery   · Avoid sneezing or blowing your nose for 2 weeks after surgery / if you do sneeze, do so with your mouth open  · Avoid strenuous activity for 2 weeks after surgery / do not lift heavy objects (greater than 10 pounds) for 1 week after surgery  · Walking is strongly encouraged after surgery   · Most patients return to work without about 5 to 7 days after surgery but this is variable    Diet  · You can resume a normal diet within 24 hours of the surgery      Fever  · A low-grade fever, less than 101° F is not uncommon for 2 to 3 days after surgery. If fever continues or is higher than 101° F, call your physician.  You can use Tylenol® to control the fever.      Pain Control  · Pain is variable after nasal surgery but is generally well controlled with pain medication.  Most patients feel pressure and congestion.  Pain should lessen with time. If pain increases, call our office.  · For mild pain, acetaminophen (Tylenol®) is recommended.  We suggest scheduling 500-1000 mg of acetaminophen every 6 hours for the first several days (unless you are allergic to this medicine or have problems with your liver).  Do not take more than 4000mg in a day.  We will also prescribe stronger pain medication for after surgery. Drink plenty of water as these stronger pain medications can be constipating.  We  also recommend that you take stool softeners on a regular basis while taking narcotic pain medication.  · You may also take ibuprofen (Advil®, Motrin®), naproxen (Aleve®, Naprosyn®), and other nonsteroidal anti-inflammatory medications (NSAIDs) following nasal surgery. You may take as directed on the bottle for the first several days. Ibuprofen may be alternated with tylenol to improve your pain control around the clock (ex: tylenol at 8:00 am, ibuprofen at 11:00 am, tylenol at 2:00 pm, ibuprofen at 5:00pm, etc.)    Drainage  · You can expect to have bloody nasal drainage after nasal or sinus surgery. To catch   this drainage and to help avoid continuous nose wiping, we usually put a gauze “mustache” dressing under the nose and tape it to the cheeks for as long as the drainage continues.    · BLEEDING: Some blood in the nasal drainage after surgery is expected. This may be red, dark red or brown. One way to monitor this is to tape a piece of gauze under the nose to collect the bloody drainage. This may saturate with blood every 1 to 2 hours for the first few days after surgery. If it saturates completely with blood (blood dripping off of it and totally red) MORE FREQUENTLY THAN EVERY 30 MINUTES then call our office. Avoid nose blowing and try to sneeze with your mouth open.  Sleeping with your head elevated for at least the first night will also help prevent bleeding and reduce congestion. If you have a nosebleed, try spraying a topical decongestant spray (see page 1) into the side of bleeding 2-3 sprays and hold gentle pressure for 10 minutes.  If the bleeding continues after this is done twice call our office.       Care of the Nose  ·   NASAL SALINE IRRIGATION: THIS IS VERY IMPORTANT - Please START IRRIGATIONS THE MORNING AFTER SURGERY. After sinus surgery, we like to have the nose irrigated with a NeilMed Sinus Rinse® bottle (irrigation instructions and saline solution recipe are listed). This will help rinse  the blood clots and mucous from the nose.   We recommend doing your nasal irrigations AT LEAST 3 TIMES A DAY UNTIL YOUR FOLLOW UP WITH YOUR SURGEON. IF YOU CAN DO IT 5-6 TIMES A DAY THAT WOULD BE IDEAL.  · Try not to manipulate your nose with your fingers     How to Irrigate  · Fill the NeilMed Sinus Rinse® bottle with the room temperature saline solution (see below for recipe). Gently insert the tip into one nostril and squeeze. Keep your head down and blow out through your mouth when you irrigate to prevent water from going back down into your throat. Use about one half of the bottle per nostril. Do this for each nostril at least three times daily until your follow up with your surgeon. The more you can do the irrigation the better. You will likely be irrigating regularly for at least a month or two.    Nasal Irrigation Solution Recipe  · 8 ounces of room temperature distilled water. (If you use tap water, boil it first and let it cool to room temperature.)  · ½ teaspoon of table salt  · ½ teaspoon of baking soda  You can use the NeilMed Sinus Rinse® solution packets if preferred    Follow up  Your appointment for follow up after your surgery should have been scheduled at the time of your surgery. If not, call the office for an appointment scheduled for 1-2 weeks after the date of your surgery.    Call the office or GO TO THE EMERGENCY ROOM if you have:  · Excessive pain, swelling, bleeding or drainage  · Shortness of breath or difficulty breathing  · Persistent nausea and vomiting  · Fever that continues or is higher than 101° F  · Neck stiffness (cannot touch your chin to your chest)  · Confusion  · Worsening headaches that do not respond to pain medication  · Reproducible clear drainage dripping from your nose like a leaky faucet (particularly one sided).  Note:  This may happen and is normal up to 30 minutes following saline irrigations or sprays but if it is reproducible despite stopping saline please contact  our office   · Salty or metallic taste (note that you may experience a salty taste that is normal up to 30 minutes following saline use)    Do not hesitate to call if you have any questions or concerns:  Offices of Otolaryngology - Division of Rhinology  Dr. Fitzpatrick [phone]    Normal office hours are:  8am-4pm Monday - Friday   If there is an after-hours EMERGENCY related to your procedure please call [phone] (ask for the “ENT resident on-call”).

## 2024-01-03 NOTE — OP NOTE
Operative Note     Date: 1/3/2024  [facility]    Name: Aretha Nuñez : 1995, Age: 28 y.o., MRN: [id number], Sex: female    Diagnosis  Pre-op Diagnosis     * Chronic pansinusitis [J32.4]     * Deviated nasal septum [J34.2]     * Nasal obstruction [J34.89]     * Nasal drainage [J34.89]     * Chronic frontal sinusitis [J32.1]     * Chronic sphenoidal sinusitis [J32.3] Post-op Diagnosis     * Chronic pansinusitis [J32.4]     * Deviated nasal septum [J34.2]     * Nasal obstruction [J34.89]     * Nasal drainage [J34.89]     * Chronic frontal sinusitis [J32.1]     * Chronic sphenoidal sinusitis [J32.3]       Operation/Procedure(s):     1. Bilateral endoscopic total ethmoidectomies with sphenoidotomies   2. Bilateral endoscopic frontal sinusotomies with frontal sinus explorations  3. Bilateral endoscopic maxillary antrostomies with removal of tissue from sinuses  4. Septoplasty  5. Bilateral inferior turbinate outfractures  6. Image guidance navigation - extradural    Surgeon: Suraj Fitzpatrick MD FACS    Assistant(s): None    EBL: 100 ml    Anesthesia: General and local     Operative Findings:  1. Chronic inflammation through all sinuses, and removal of middle turbinate partially on the right.  2. Absorbable hemostatic material in the ethmoids  3. Collado Splint sutured to the septum bilaterally     Operative Indications:   Aretha Nuñez is a 28 y.o.  female  with a history of chronic rhinosinusitis - Bilateral pansinusitis, deviated nasal septum. The patient was treated with maximal medical therapy and a post-treatment CT scan showed persistent disease. Therefore, the risks, benefits, and alternatives of the above procedures were discussed and the patient agreed to proceed. Please see the ambulatory EMR for full documentation and detail of this discussion.    Operative/Procedure Narrative:   The patient was brought into the operating room and laid in a supine position on the operating room table. A surgical  huddle was conducted to verify the patient and the procedure to be performed. General anesthesia was induced and the patient's airway was secured with an ET tube. A time out was called. The nasal cavities were sprayed with 0.5% Afrin. The right and left nasal cavities were then injected with lidocaine 1% with 1:100,000 epinephrine. Next, image guidance was attached and registered. The image guidance was used through the procedure for identification of critical landmarks. Once the image guidance was calibrated, the nasal cavities were examined with a 0 degree endoscope.    The patient was noted to have enlarged inferior turbinates on the right and left, a broad septal deviation to the left.   Using a zero degree endoscope for visualization and a Stephenson elevator on the right, the middle turbinate was medialized. The double ball probe and backbiting forceps were used to perform a retrograde uncinectomy. The probe was then used to cannulate the maxillary os and a large maxillary antrostomy was made with a straight through cutting forceps. The edges were cleaned up with the microdebrider. The maxillary sinus was entered and suctioned and tissue was removed. Attention was then turned to the ethmoid air cells. The ethmoid bulla and basal lamella were resected with cutting instruments and the microdebrider. The location of the skull base and lamina papyracea was periodically confirmed with the surgical navigation. Posteriorly, the superior turbinate was identified and the inferior third was excised using the microdebrider. The sphenoid ostium was cannulated using the probe. The mushroom punch was used to complete a sphenoidotomy. The sphenoid sinusotomy was further dissected using the cutting instruments.  We then proceeded in a posterior to anterior fashion dissecting remnant ethmoid air cells off the skull base and orbit to complete a total ethmoidectomy. We then switched to a 70 degree scope and visualized the frontal  outflow tract. Using a frontal sinus probe, the frontal os was identified. A frontal sinusotomy was created using a Hosemann punch, giraffe forceps and a microdebrider. The middle turbinate was partially removed as it was largely involved with disease.     Attention was then turned towards the septoplasty. A #15 blade was used to make a Modified Zavalla incision at the squamocolumnar junction on the left. A Vance elevator was used to elevate a mucoperichondrial flap on the left septum. An incision was made through the cartilage using the suction elevator then the opposing septal flap was elevated. Using a Karan forceps the intervening septal cartilage was removed, taking care to leave an approximately 2-cm dorsal and caudal cartilaginous strut for support. There were unilateral mucosal tears but none contralaterally. The septum was then noted to be straight and the airway improved on both sides. The incision was closed using interrupted 4-0 plain gut sutures.     The left nasal cavity was then examined with the 0 degree endoscope and the middle turbinate was medialized with a freer.  The double ball probe and backbiting forceps were used to perform a retrograde uncinectomy. The probe was then used to cannulate the maxillary os and a large maxillary antrostomy was made with a straight through cutting forceps. The edges were cleaned up with the microdebrider. The maxillary sinus was entered and suctioned and tissue was removed. Attention was then turned to the ethmoid air cells. The ethmoid bulla and basal lamella were resected with cutting instruments and the microdebrider. The location of the skull base and lamina papyracea was periodically confirmed with the surgical navigation. Posteriorly, the superior turbinate was identified and the inferior third was excised using the microdebrider. The sphenoid ostium was cannulated using the probe. The mushroom punch was used to complete a sphenoidotomy. The sphenoid  sinusotomy was further dissected using the cutting instruments.  We then proceeded in a posterior to anterior fashion dissecting remnant ethmoid air cells off the skull base and orbit to complete a total ethmoidectomy. We then switched to a 70 degree scope and visualized the frontal outflow tract. Using a frontal sinus probe, the frontal os was identified. A frontal sinusotomy was created using a Hosemann punch, giraffe forceps and a microdebrider. The middle turbinate was preserved.    The right and left inferior turbinates were then infiltrated with 1% lidocaine with epinephrine. A stab incision was then made in the head of the left inferior turbinate and the right inferior turbinate. A Republic elevator was used to elevate the mucous membrane off the inferior conchal bone on both sides and the microdebrider inferior turbinate blade attachment was then used to perform a submucous resection of tissue in the right and then the left inferior turbinates. The turbinates were then outfractured laterally with a Boies elevator. The nasopharynx was then suctioned and the nose was copiously irrigated with normal saline. It was inspected for any bleeding and none was noted. Absorbable hemostatic material was placed in the bilateral ethmoid cavities. Collado splints were placed and secured to the septum with a prolene suture.  This completed the surgical procedure. An OG tube was passed to suction out gastric contents. The patient was turned over to the anesthesia team for awakening and extubation. They were transferred to the PACU in stable condition.     Implants/Packing: Absorbable hemostatic material  , and Collado splints sutured to septum     Specimens:   Sinonasal contents to pathology     Complications:   None    Attestation: I, Suraj Fitzpatrick, was present for and completed all key portions of the procedure and there were no qualified residents available to assist me.      Suraj Fitzpatrick  Phone Number: [phone]

## 2024-01-08 NOTE — PATIENT INSTRUCTIONS
Please observe the following post-op precautions for 2 weeks from the date of your surgery (until at least January 17):  Please refrain from blowing your nose. Please do not stifle any sneezes. If you must sneeze, then try to sneeze through an open mouth. Please do not stick anything in your nose other than any medicine or irrigations we recommend. Please do not insert a Q-tip or finger in the nose at this time as this will cause further trauma. Please refrain from any activities that will increase your heart rate or blood pressure. Try to keep your head above your heart as much as possible. Please refrain from lifting objects greater than 10 lbs.    Stopping a Nosebleed:  If you get a nosebleed that does not stop easily, please squirt 3-4 sprays of Afrin, oxymetazoline, or Anuj-Synephrine into the bleeding nostril, and pinch your nostrils together for 10 minutes (across the soft part of the nose).  If bleeding persists, squirt 3-4 more sprays of Afrin, oxymetazoline, or Anuj-Synephrine into the bleeding nostril, and pinch your nostrils together for another 10 minutes.  If the nosebleed does not stop after 3 attempts, call our office at [phone]. If you are unable to get in touch with us, please go to the Emergency Room.    Please start doing budesonide Irrigations as noted below:  In the morning, please do a saline irrigation followed by a budesonide irrigation.  In the middle of the day, please do a saline irrigation only. This is optional.  In the evening, please do a saline irrigation followed by a budesonide irrigation.  To prepare a budesonide irrigation: Add 1 NeilMed salt packet to 8 ounces of distilled water, or use the recipe below to make your own saline solution. Then, add 1 budesonide capsule to the saline solution. Shake to dissolve. Use half of the mixture (4 ounces) in each nostril.  Make sure the budesonide irrigation is the last irrigation you do for several hours and the last irrigation before  going to sleep.    Recipe to Make your Own Nasal Saline Solution:  Mix 8 ounces of distilled water or tap water (be sure to boil it then let it cool down) with 1/2 teaspoon of baking soda and 1/2 teaspoon of table salt and shake bottle to dissolve.    We have ordered budesonide through a company called [facility]. This is a compounding pharmacy that we use to make medications for your irrigations more affordable. Dr. Fitzpatrick will fax the order over to [facility] today. This will result in an out of pocket expense for you of approx $50 per month (instead of $300 without a compounding pharmacy). The compounding pharmacy will call you in the next few days to establish form of payment and verify the address to send the medication. If you have not heard from them after 3-4 days from today's visit, please call our office at [phone] so that we can get in contact with them.    Recipe to Make your Own Nasal Saline Solution:  Mix 8 ounces of distilled water or tap water (be sure to boil it then let it cool down) with 1/2 teaspoon of baking soda and 1/2 teaspoon of table salt and shake bottle to dissolve.    Saline spray is optional. Its purpose is to moisturize the front of your nose.    Please follow up with me on Thursday, January 25 at 9 am for your second postoperative visit. Please feel free to contact my office by calling [phone] with any questions.    Scribe Attestation  By signing my name below, I, Lucio Gardner, attest that this documentation has been prepared under the direction and in the presence of Suraj Fitzpatrick MD. All medical record entries made by the Scribe were at my direction or personally dictated by me. I have reviewed the chart and agree that the record accurately reflects my personal performance of the history, physical exam, discussion and plan.

## 2024-01-08 NOTE — PROGRESS NOTES
HPI   Aretha Nuñez is a 28 y.o. old female h/o CRS, DNS, nasal obstruction/drainage s/p bilateral ESS (total), septoplasty, bilateral SOTERO, partial removal of right middle turbinate. The patient had surgery 1/3/2024.  1/11/24 - Patient presents for her 1st post op visit. She reports acute concerns. She has had a few epistaxis, mostly with bending over and moving pillows. Last nosebleed affected both sides of the nose and was on 1/9/24. The nosebleeds typically resolve with Afrin. She reports expected post operative congestion and edema. She is doing saline irrigations 4-5 times daily, which have been productive of crusts and dried/old blood. She notices some blurriness in her vision. This was present before sinus surgery. She denies constant clear fluid from nose, severe headaches, double vision, or fevers.    Allergies: Amoxicillin, Hydrocodone, Sulfamethoxazole-trimethoprim, and Amoxicillin-pot clavulanate    Operative Report:  Date of Service: 1/3/2024  Location: [facility] [facility]    Post-op Diagnoses:  * Chronic pansinusitis [J32.4]  * Deviated nasal septum [J34.2]  * Nasal obstruction [J34.89]  * Nasal drainage [J34.89]  * Chronic frontal sinusitis [J32.1]  * Chronic sphenoidal sinusitis [J32.3]    Operation/Procedures:  1. Bilateral endoscopic total ethmoidectomies with sphenoidotomies   2. Bilateral endoscopic frontal sinusotomies with frontal sinus explorations  3. Bilateral endoscopic maxillary antrostomies with removal of tissue from sinuses  4. Septoplasty  5. Bilateral inferior turbinate outfractures  6. Image guidance navigation - extradural    Operative Findings:  1. Chronic inflammation through all sinuses, and removal of middle turbinate partially on the right.  2. Absorbable hemostatic material in the ethmoids  3. Collado Splint sutured to the septum bilaterally    Surgeon: Suraj Fitzpatrick MD FACS  Assistant: None  EBL: 100 ml  Anesthesia: General and local    Per Hilary Thibodeaux's prior note 11/1/23:  Aretha  Joaquin is a 28 y.o. old female, referred by Dr. Jovita Gaona,  presenting with complaints of sinus and nose problems that began about 1.5 years ago. The patient describes the sinus/nose problems as gradual onset of nasal obstruction (bilateral, worse with laying down). She also notes nasal drainage (anterior/posterior, clear). She also has frontal area pressure. She has had a significantly diminished sense of smell over the last year. Patient denies facial pain, periorbital edema, throat clearing, hoarseness, loss of taste. The patient denies epistaxis. The patient has taken antibiotics, claritin medications to alleviate the problem. She has tried Flonase, Vicks. The medication Claritin has helped. The patient has tried nasal saline irrigations. Does not have a history of allergic rhinitis or allergies. Allergy testing was negative. They deny a history of aspirin sensitivity. They report 2 sinus infections per year requiring antibiotic treatment. Most recent antibiotic usage includes: Doxycycline (current) x 20 days (on day 14).    History of prior nasal/sinus surgery or procedure: Denies    Review of Systems   Negative for constitutional, eyes, cardiac, pulmonary, hepatic, renal, digestive, hematologic, epileptic, syncopal, musculoskeletal, mental health, integumentary, hypertensive, lipid, arthritic, diabetic, thyroid or neurologic disorders (except as listed in the HPI, PMH and Problem List).    Assessment   Aretha Nuñez is a 28 y.o. female h/o CRS, DNS, nasal obstruction/drainage s/p bilateral ESS (total), septoplasty, bilateral SOTERO, partial removal of right middle turbinate. The patient had surgery 1/3/2024.  1/11/24 - 1st post op. Is having persistent bleeding episodes. Expected post op swelling today. Debridement performed as noted. Rx budesonide irrigations BID x 1 month. Continue saline irrigations. Advised on Afrin usage and pinching nose and continuing restrictions until 2 weeks post op then gradual  increase in activity thereafter.    Plan   I previously reviewed the patients CT scan images and results. I previously discussed the results personally with the patient. The following findings were discussed: 10/17/23: CT Sinus: Left nasal septal deviation. Near complete opacification of the ethmoid sinuses bilaterally. Moderate mucosal thickening of the bilateral maxillary sinuses. Minimal of the frontal, sphenoid sinuses. There is also obstruction of the bilateral frontoethmoid outflow tracts as the well as the sphenoethmoid recesses.    Nasal Endoscopy with debridement. Findings: expected post op swelling.  Patient was prescribed budesonide irrigations twice daily for 1 month. Patient will instructed to continue nasal saline irrigations before each budesonide irrigation.  Reassurance provided to patient, the nose is healing and edema at this point is expected.  Follow up as scheduled for repeat evaluation and debridement.    Referring Provider: Hilary Thibodeaux CNP  I will provide a report to the referring provider via the electronic medical record or US mail.    Suraj Fitzpatrick MD [facility]  Division of Rhinology, Sinus, and Skull Base Surgery       Exam   General: This is a healthy appearing female who appears her stated age. The patient is alert and appropriately verbally conversant without hoarseness.  Face: The face was inspected and no cutaneous masses or lesions were visualized. There was no erythema or edema noted. Facial movement was symmetric without weakness. No skin lesions were detected.  Eyes: Extra-ocular muscle function was intact. No nystagmus was observed. Pupils were equal.    Nose: Examination of the nose revealed the nasal dorsum to be midline. Intranasal exam reveals the septum is healthy without perforation. The inferior turbinates were expectedly edematous. Crusts and dried secretions noted on anterior rhinoscopy. See below procedure note as applicable for further exam.    Procedure  Note:  Procedure: Nasal endoscopy with debridement - bilateral  Indication: Chronic rhinosinusitis, post operative from sinus surgery  Informed consent obtained: risks, benefits, alternatives, and expectations discussed with patient and the patient wishes to proceed.    Findings: After anesthesia and decongestion with topical lidocaine and Afrin spray, the nasal cavities were examined and debrided with a zero and/or 30-degree endoscope. Large crusts were taken down from the anterior nasal chambers with a straight Blakesley forceps. Crusts were debrided and removed from the middle meatus and ethmoid cavity on the right and left. Left maxillary sinus was filled with clot. Sphenoidotomies are clear. The maxillary and ethmoid sinuses are widely patent. The frontal recesses were clear. No pus or polyps were noted. There is no CSF leak. Things are healing well. The patient tolerated the procedure well and there were no complications.       Scribe Attestation  By signing my name below, I, Lucio Gardner, attest that this documentation has been prepared under the direction and in the presence of Suraj Fitzpatrick MD. All medical record entries made by the Scribe were at my direction or personally dictated by me. I have reviewed the chart and agree that the record accurately reflects my personal performance of the history, physical exam, discussion and plan.

## 2024-01-11 ENCOUNTER — OFFICE VISIT (OUTPATIENT)
Dept: OTOLARYNGOLOGY | Facility: CLINIC | Age: 29
End: 2024-01-11
Payer: COMMERCIAL

## 2024-01-11 VITALS — WEIGHT: 113 LBS | HEIGHT: 63 IN | BODY MASS INDEX: 20.02 KG/M2

## 2024-01-11 DIAGNOSIS — J32.4 CHRONIC PANSINUSITIS: Primary | ICD-10-CM

## 2024-01-11 DIAGNOSIS — J34.89 NASAL CRUSTING: ICD-10-CM

## 2024-01-11 PROCEDURE — 31237 NSL/SINS NDSC SURG BX POLYPC: CPT | Performed by: OTOLARYNGOLOGY

## 2024-01-11 PROCEDURE — 99024 POSTOP FOLLOW-UP VISIT: CPT | Performed by: OTOLARYNGOLOGY

## 2024-01-11 PROCEDURE — 1036F TOBACCO NON-USER: CPT | Performed by: OTOLARYNGOLOGY

## 2024-01-11 RX ORDER — BUDESONIDE
0.6 POWDER (GRAM) MISCELLANEOUS 2 TIMES DAILY
Qty: 60 G | Refills: 0 | Status: SHIPPED | OUTPATIENT
Start: 2024-01-11 | End: 2024-03-04 | Stop reason: WASHOUT

## 2024-01-18 LAB
LABORATORY COMMENT REPORT: NORMAL
PATH REPORT.FINAL DX SPEC: NORMAL
PATH REPORT.GROSS SPEC: NORMAL
PATH REPORT.RELEVANT HX SPEC: NORMAL
PATH REPORT.TOTAL CANCER: NORMAL

## 2024-01-19 ENCOUNTER — TELEPHONE (OUTPATIENT)
Dept: OTOLARYNGOLOGY | Facility: CLINIC | Age: 29
End: 2024-01-19
Payer: COMMERCIAL

## 2024-01-19 DIAGNOSIS — J32.4 CHRONIC PANSINUSITIS: ICD-10-CM

## 2024-01-19 RX ORDER — AZITHROMYCIN 250 MG/1
TABLET, FILM COATED ORAL
Qty: 6 TABLET | Refills: 0 | Status: SHIPPED | OUTPATIENT
Start: 2024-01-19 | End: 2024-01-24

## 2024-01-19 RX ORDER — METHYLPREDNISOLONE 4 MG/1
TABLET ORAL
Qty: 21 TABLET | Refills: 0 | Status: SHIPPED | OUTPATIENT
Start: 2024-01-19 | End: 2024-01-26

## 2024-01-19 NOTE — TELEPHONE ENCOUNTER
Patient called into office today with concerns of intense right facial/ear pressure, nasal congestion mainly on the right side. Patient states her drainage since surgery has been clear with occasional blood clots but has turned to green over the last 2 days along with the facial pressure. She continues to irrigate BID with Budesonide. Discussed with Dr. Fitzpatrick who gave a verbal order for Zpak and Medrol dosepak. Medication education provided to patient, advised patient to follow instructions for dosing on the prescriptions and to take medication after meals. Patient advised to take Medrol dosepak each day before noon to avoid difficulty sleeping. The potential side effects of oral steroid use include but are not limited to: difficulty sleeping/insomnia, increased appetite, fluid retention, mood swings, weight gain, change in blood pressure, high blood glucose, possible adrenal suppression, osteoporosis, avascular necrosis of the hip, menstrual irregularities (if applicable), glaucoma, and cataracts. Patient agrees to plan of care and wishes to take both prescriptions. Advised patient to discontinue mediations if adverse reactions. Patient will follow up in clinic as scheduled for her second post operative visit.

## 2024-01-22 NOTE — PATIENT INSTRUCTIONS
PATIENT INSTRUCTIONS ARE COMPLETE and READY TO PRINT    Prednisone:  Please start a 12-day tapering course of prednisone. This will help reduce the post-surgical swelling. Take as directed. Please take the prednisone each day before noon. If you take the prednisone later in the day, it can cause difficulty sleeping.  The potential side effects of oral steroid use include but are not limited to: difficulty sleeping/insomnia, increased appetite, fluid retention, mood swings, weight gain, change in blood pressure, high blood glucose, possible adrenal suppression, osteoporosis, avascular necrosis of the hip, menstrual irregularities, glaucoma, and cataracts.    Please continue doing budesonide irrigations as noted below:  In the morning, please do a saline irrigation followed by a budesonide irrigation.  In the evening, please do a saline irrigation followed by a budesonide irrigation.  To prepare a budesonide irrigation: Add 1 NeilMed salt packet to 8 ounces of distilled water, or use the recipe below to make your own saline solution. Then, add 1 budesonide capsule to the saline solution. Shake to dissolve. Use half of the mixture (4 ounces) in each nostril.  Make sure the budesonide irrigation is the last irrigation you do for several hours and the last irrigation before going to sleep.    Recipe to Make your Own Nasal Saline Solution:  Mix 8 ounces of distilled water or tap water (be sure to boil it then let it cool down) with 1/2 teaspoon of baking soda and 1/2 teaspoon of table salt and shake bottle to dissolve.    Stopping a Nosebleed:  If you get a nosebleed that does not stop easily, please squirt 3-4 sprays of Afrin, oxymetazoline, or Anuj-Synephrine into the bleeding nostril, and pinch your nostrils together for 10 minutes (across the soft part of the nose).  If bleeding persists, squirt 3-4 more sprays of Afrin, oxymetazoline, or Anuj-Synephrine into the bleeding nostril, and pinch your nostrils together for  another 10 minutes.  If the nosebleed does not stop after 3 attempts, call our office at [phone]. If you are unable to get in touch with us, please go to the Emergency Room.    Please follow up with me in 6 weeks for reevaluation or sooner with any questions or concerns. Please feel free to contact my office at [phone] with any questions.    If you develop thick yellow green drainage or facial pressure, please contact our office by calling [phone].    Scribe Attestation  By signing my name below, I, Lucio Gardner, attest that this documentation has been prepared under the direction and in the presence of Suraj Fitzpatrick MD. All medical record entries made by the Scribe were at my direction or personally dictated by me. I have reviewed the chart and agree that the record accurately reflects my personal performance of the history, physical exam, discussion and plan.

## 2024-01-22 NOTE — PROGRESS NOTES
ROLANDA Nuñez is a 28 y.o. old female h/o CRS, DNS, nasal obstruction/drainage s/p bilateral ESS (total), septoplasty, bilateral SOTERO, partial removal of right middle turbinate. The patient had surgery 1/3/2024.  1/11/24 - Patient presents for her 1st post op visit. She reports acute concerns. She has had a few epistaxis, mostly with bending over and moving pillows. Last nosebleed affected both sides of the nose and was on 1/9/24. The nosebleeds typically resolve with Afrin. She reports expected post operative congestion and edema. She is doing saline irrigations 4-5 times daily, which have been productive of crusts and dried/old blood. She notices some blurriness in her vision. This was present before sinus surgery. She denies constant clear fluid from nose, severe headaches, double vision, or fevers.    On 1/19/24, the patient called into office with concerns of intense right facial/ear pressure, nasal congestion mainly on the right side. Patient states her drainage since surgery has been clear with occasional blood clots but has turned to green over the last 2 days along with the facial pressure. She continues to irrigate BID with Budesonide. Discussed with Dr. Fitzpatrick who gave a verbal order for Zpak and Medrol dosepak.    1/25/24 - Patient presents for her 2nd post op visit. She notes that this was slightly helpful. She does feel that her drainage is green at this time and posterior. She is irrigating twice daily (1 bottle saline, 1 bottle budesonide twice daily). Irrigations are productive of discolored green nasal drainage. She has been getting headaches and pressure (frontal, maxillary, and can feel this in her teeth). She also feels significant fatigue. She feels that this all has been present for at least a week. She has 1 day left of the Medrol Dospak that was prescribed on 1/19. She denies excessive bleeding, constant clear fluid from nose, severe headaches, double vision, or fevers.    Allergies:  Amoxicillin, Hydrocodone, Sulfamethoxazole-trimethoprim, and Amoxicillin-pot clavulanate    Operative Report:  Date of Service: 1/3/2024  Location: [facility] [place] [facility]    Post-op Diagnoses:  * Chronic pansinusitis [J32.4]  * Deviated nasal septum [J34.2]  * Nasal obstruction [J34.89]  * Nasal drainage [J34.89]  * Chronic frontal sinusitis [J32.1]  * Chronic sphenoidal sinusitis [J32.3]    Operation/Procedures:  1. Bilateral endoscopic total ethmoidectomies with sphenoidotomies   2. Bilateral endoscopic frontal sinusotomies with frontal sinus explorations  3. Bilateral endoscopic maxillary antrostomies with removal of tissue from sinuses  4. Septoplasty  5. Bilateral inferior turbinate outfractures  6. Image guidance navigation - extradural    Operative Findings:  1. Chronic inflammation through all sinuses, and removal of middle turbinate partially on the right.  2. Absorbable hemostatic material in the ethmoids  3. Collado Splint sutured to the septum bilaterally    Surgeon: Suraj Fitzpatrick MD FACS  Assistant: None  EBL: 100 ml  Anesthesia: General and local    Per Hilary Thibodeaux's prior note 11/1/23:  Aretha Nuñez is a 28 y.o. old female, referred by Dr. Jovita Gaona,  presenting with complaints of sinus and nose problems that began about 1.5 years ago. The patient describes the sinus/nose problems as gradual onset of nasal obstruction (bilateral, worse with laying down). She also notes nasal drainage (anterior/posterior, clear). She also has frontal area pressure. She has had a significantly diminished sense of smell over the last year. Patient denies facial pain, periorbital edema, throat clearing, hoarseness, loss of taste. The patient denies epistaxis. The patient has taken antibiotics, claritin medications to alleviate the problem. She has tried Flonase, Vicks. The medication Claritin has helped. The patient has tried nasal saline irrigations. Does not have a history of allergic rhinitis or allergies. Allergy  testing was negative. They deny a history of aspirin sensitivity. They report 2 sinus infections per year requiring antibiotic treatment. Most recent antibiotic usage includes: Doxycycline (current) x 20 days (on day 14).    History of prior nasal/sinus surgery or procedure: Denies    Review of Systems   Negative for constitutional, eyes, cardiac, pulmonary, hepatic, renal, digestive, hematologic, epileptic, syncopal, musculoskeletal, mental health, integumentary, hypertensive, lipid, arthritic, diabetic, thyroid or neurologic disorders (except as listed in the HPI, PMH and Problem List).    Assessment   Aretha Nuñez is a 28 y.o. female h/o CRS, DNS, nasal obstruction/drainage s/p bilateral ESS (total), septoplasty, bilateral SOTERO, partial removal of right middle turbinate. The patient had surgery 1/3/2024.  1/11/24 - 1st post op. Is having persistent bleeding episodes. Expected post op swelling today. Debridement performed as noted. Rx budesonide irrigations BID x 1 month. Continue saline irrigations. Advised on Afrin usage and pinching nose and continuing restrictions until 2 weeks post op then gradual increase in activity thereafter.  1/25/24 - 2nd post op. Completed Zpak and medrol. Pt still reports extreme fatigue. No infection but significant post-surgical edema.  Continue budesonide irrigations BID. Rx pred x 12 days.    Plan   Nasal Endoscopy with debridement. Findings: expected post op swelling.  Patient was prescribed a 12-day tapering course of prednisone. The potential side effects of oral steroid use were discussed at length with the patient. These risks include but are not limited to: difficulty sleeping/insomnia, increased appetite, fluid retention, mood swings, weight gain, change in blood pressure, high blood glucose, possible adrenal suppression, osteoporosis, avascular necrosis of the hip, menstrual irregularities, glaucoma, and cataracts. The patient understands these risks and is willing to  proceed with oral steroid therapy.  Continue budesonide irrigations twice daily until finished. Patient will instructed to continue nasal saline irrigations before each budesonide irrigation.  Reassurance provided to patient, the nose is healing and edema at this point is expected.  Follow up in 6 weeks.    Referring Provider: Hilary Thibodeaux CNP  I will provide a report to the referring provider via the electronic medical record or US mail.    Suraj Fitzpatrick MD [facility]  Division of Rhinology, Sinus, and Skull Base Surgery       Exam   General: This is a healthy appearing female who appears her stated age. The patient is alert and appropriately verbally conversant without hoarseness.  Face: The face was inspected and no cutaneous masses or lesions were visualized. There was no erythema or edema noted. Facial movement was symmetric without weakness. No skin lesions were detected.  Eyes: Extra-ocular muscle function was intact. No nystagmus was observed. Pupils were equal.    Nose: Examination of the nose revealed the nasal dorsum to be midline. Intranasal exam reveals the septum is healthy without perforation. The inferior turbinates were expectedly edematous. Crusts and dried secretions noted on anterior rhinoscopy. See below procedure note as applicable for further exam.    Procedure Note:  Procedure: Nasal endoscopy with debridement - bilateral  Indication: Chronic rhinosinusitis, post operative from sinus surgery  Informed consent obtained: risks, benefits, alternatives, and expectations discussed with patient and the patient wishes to proceed.    Findings: After anesthesia and decongestion with topical lidocaine and Afrin spray, the nasal cavities were examined and debrided with a zero and/or 30-degree endoscope. Small crusts were taken down from the posterior nasal chambers and/or with a straight Blakesley forceps. Crusts were debrided and removed from the middle meatus and ethmoid cavity on the right and left.  Left maxillary sinus was filled with post surgical edema. Sphenoidotomies are clear. The maxillary and ethmoid sinuses are widely patent. The frontal recesses were clear. Left frontal sinusotomy is patent. Right is blocked with edema. No pus or polyps were noted. There is no CSF leak. Things are healing well. The patient tolerated the procedure well and there were no complications.       Scribe Attestation  By signing my name below, I, Lucio Gardner, attest that this documentation has been prepared under the direction and in the presence of Suraj Fitzpatrick MD. All medical record entries made by the Scribe were at my direction or personally dictated by me. I have reviewed the chart and agree that the record accurately reflects my personal performance of the history, physical exam, discussion and plan.

## 2024-01-25 ENCOUNTER — OFFICE VISIT (OUTPATIENT)
Dept: OTOLARYNGOLOGY | Facility: CLINIC | Age: 29
End: 2024-01-25
Payer: COMMERCIAL

## 2024-01-25 VITALS — WEIGHT: 110 LBS | BODY MASS INDEX: 19.49 KG/M2 | HEIGHT: 63 IN

## 2024-01-25 DIAGNOSIS — J34.89 NASAL CRUSTING: ICD-10-CM

## 2024-01-25 DIAGNOSIS — J32.4 CHRONIC PANSINUSITIS: Primary | ICD-10-CM

## 2024-01-25 PROBLEM — H93.8X9 PRESSURE SENSATION IN EAR: Status: ACTIVE | Noted: 2024-01-25

## 2024-01-25 PROBLEM — R20.8 DYSESTHESIA: Status: ACTIVE | Noted: 2024-01-25

## 2024-01-25 PROBLEM — R09.81 CONGESTION OF PARANASAL SINUS: Status: ACTIVE | Noted: 2024-01-25

## 2024-01-25 PROCEDURE — 1036F TOBACCO NON-USER: CPT | Performed by: OTOLARYNGOLOGY

## 2024-01-25 PROCEDURE — 31237 NSL/SINS NDSC SURG BX POLYPC: CPT | Performed by: OTOLARYNGOLOGY

## 2024-01-25 PROCEDURE — 99024 POSTOP FOLLOW-UP VISIT: CPT | Performed by: OTOLARYNGOLOGY

## 2024-01-25 RX ORDER — PREDNISONE 10 MG/1
TABLET ORAL
Qty: 30 TABLET | Refills: 0 | Status: SHIPPED | OUTPATIENT
Start: 2024-01-25

## 2024-01-25 RX ORDER — APREPITANT 40 MG/1
40 CAPSULE ORAL
COMMUNITY
Start: 2024-01-03 | End: 2024-03-28 | Stop reason: ALTCHOICE

## 2024-03-04 ENCOUNTER — OFFICE VISIT (OUTPATIENT)
Dept: PRIMARY CARE | Facility: CLINIC | Age: 29
End: 2024-03-04
Payer: COMMERCIAL

## 2024-03-04 VITALS
HEART RATE: 93 BPM | BODY MASS INDEX: 19.49 KG/M2 | DIASTOLIC BLOOD PRESSURE: 79 MMHG | RESPIRATION RATE: 16 BRPM | SYSTOLIC BLOOD PRESSURE: 109 MMHG | OXYGEN SATURATION: 95 % | TEMPERATURE: 98.8 F | WEIGHT: 110 LBS

## 2024-03-04 DIAGNOSIS — R50.9 FEVER, UNSPECIFIED FEVER CAUSE: ICD-10-CM

## 2024-03-04 DIAGNOSIS — J10.1 INFLUENZA B: Primary | ICD-10-CM

## 2024-03-04 LAB
POC RAPID INFLUENZA A: NEGATIVE
POC RAPID INFLUENZA B: POSITIVE

## 2024-03-04 PROCEDURE — 99213 OFFICE O/P EST LOW 20 MIN: CPT | Performed by: NURSE PRACTITIONER

## 2024-03-04 PROCEDURE — 1036F TOBACCO NON-USER: CPT | Performed by: NURSE PRACTITIONER

## 2024-03-04 PROCEDURE — 87804 INFLUENZA ASSAY W/OPTIC: CPT | Performed by: NURSE PRACTITIONER

## 2024-03-04 RX ORDER — OSELTAMIVIR PHOSPHATE 75 MG/1
75 CAPSULE ORAL 2 TIMES DAILY
Qty: 10 CAPSULE | Refills: 0 | Status: SHIPPED | OUTPATIENT
Start: 2024-03-04 | End: 2024-03-04 | Stop reason: WASHOUT

## 2024-03-04 ASSESSMENT — PATIENT HEALTH QUESTIONNAIRE - PHQ9
2. FEELING DOWN, DEPRESSED OR HOPELESS: NOT AT ALL
1. LITTLE INTEREST OR PLEASURE IN DOING THINGS: NOT AT ALL
10. IF YOU CHECKED OFF ANY PROBLEMS, HOW DIFFICULT HAVE THESE PROBLEMS MADE IT FOR YOU TO DO YOUR WORK, TAKE CARE OF THINGS AT HOME, OR GET ALONG WITH OTHER PEOPLE: NOT DIFFICULT AT ALL
SUM OF ALL RESPONSES TO PHQ9 QUESTIONS 1 AND 2: 0

## 2024-03-04 ASSESSMENT — ENCOUNTER SYMPTOMS
SORE THROAT: 1
SINUS PAIN: 0
NAUSEA: 0
DEPRESSION: 0
CHILLS: 0
VOMITING: 0
DIARRHEA: 0
COUGH: 0
FEVER: 1
FATIGUE: 1
OCCASIONAL FEELINGS OF UNSTEADINESS: 0
RHINORRHEA: 0
LOSS OF SENSATION IN FEET: 0
PALPITATIONS: 0
SHORTNESS OF BREATH: 0
ABDOMINAL PAIN: 1

## 2024-03-04 NOTE — PATIENT INSTRUCTIONS
You are positive for flu   Tamiflu sent to pharmacy   Advised patient may use Advil and/or tylenol for pain  Encouraged patient to push fluids

## 2024-03-04 NOTE — PROGRESS NOTES
Subjective   Chief Complaint: Cough, Sore Throat, and Abdominal Pain.    HPI   Aretha Nuñez is a 29 y.o. female who presents for Cough, Sore Throat, and Abdominal Pain.    Patient presents with 1 day history of sore throat, body aches, cough, fever (101 F) and abdominal pain    Her son tested positive for flu     OTC took cold and flu and believes this is causing her abdominal discomfort.     Patient denies  nausea, vomiting, diarrhea, chest pain, or shortness of breath.     Review of Systems   Constitutional:  Positive for fatigue and fever. Negative for chills.   HENT:  Positive for congestion, postnasal drip and sore throat. Negative for rhinorrhea and sinus pain.    Respiratory:  Negative for cough and shortness of breath.    Cardiovascular:  Negative for chest pain and palpitations.   Gastrointestinal:  Positive for abdominal pain. Negative for diarrhea, nausea and vomiting.       Objective   /79 (BP Location: Left arm, Patient Position: Sitting, BP Cuff Size: Adult)   Pulse 93   Temp 37.1 °C (98.8 °F)   Resp 16   Wt 49.9 kg (110 lb)   SpO2 95%   BMI 19.49 kg/m²   BSA Body surface area is 1.49 meters squared.      Physical Exam  Constitutional:       Appearance: Normal appearance.   HENT:      Right Ear: Tympanic membrane normal.      Left Ear: Tympanic membrane normal.      Nose: Rhinorrhea present.      Mouth/Throat:      Mouth: Mucous membranes are moist.   Eyes:      Pupils: Pupils are equal, round, and reactive to light.   Cardiovascular:      Rate and Rhythm: Normal rate and regular rhythm.   Pulmonary:      Effort: Pulmonary effort is normal.      Breath sounds: Normal breath sounds.   Abdominal:      General: Abdomen is flat.      Palpations: Abdomen is soft. There is no mass.      Tenderness: There is no abdominal tenderness. There is no guarding or rebound.      Hernia: No hernia is present.   Neurological:      Mental Status: She is alert.       Admission on 01/03/2024, Discharged on  01/03/2024   Component Date Value Ref Range Status    Preg Test, Ur 01/03/2024 Negative  Negative Final    Case Report 01/03/2024    Final                    Value:Surgical Pathology                                Case: [id number]                                  Authorizing Provider:  Suraj Fitzpatrick MD         Collected:           01/03/2024 1200              Ordering Location:     [facility]  Received:            01/03/2024 3687                                     OR                                                                           Pathologist:           Marissa Casey MD                                                         Specimens:   A) - SINUS CONTENTS RIGHT, RIGHT MICRODEBRIDER CONTENTS                                             B) - SINUS CONTENTS RIGHT, RIGHT MAXILLARY SINUS TISSUE                                             C) - SINUS CONTENTS RIGHT, RIGHT SINUS CONTENT                                                      D) - SINUS CONTENTS LEFT, D. LEFT MICRODEBRIDER CONTENTS                                            E) - SINUS CONTENTS LEFT, E. LEFT MAXILLARY SINUS TISSUE                                                                      F) - SINUS CONTENTS LEFT, F. LEFT SINUS CONTENTS                                                    G) - TURBINATES RIGHT, G. RIGHT MIDDLE TURBINATE                                           FINAL DIAGNOSIS 01/03/2024    Final                    Value:This result contains rich text formatting which cannot be displayed here.      01/03/2024    Final                    Value:This result contains rich text formatting which cannot be displayed here.    Clinical History 01/03/2024    Final                    Value:This result contains rich text formatting which cannot be displayed here.    Gross Description 01/03/2024    Final                    Value:This result contains rich text formatting which cannot be displayed here.   Lab on  09/18/2023   Component Date Value Ref Range Status    hCG Quantitative 09/18/2023 <3  IU/L Final    .  Total HCG measurement is performed using the [employer]  immunoassay which detects intact HCG and free beta HCG subunit.  .  This test is not indicated for use as a tumor marker.  HCG testing is performed using a different test methodology at [facility] than other [facility]. Direct result comparison  should only be made within the same method.  .  REF VALUES  NONPREGNANT FEMALE <5  MALES              <5    Glucose tolerance test Fasting 09/18/2023 57  <126 mg/dL Final    Glucose tolerance test Two Hour 09/18/2023 Canceled  mg/dL Final    GTTC3 09/18/2023 SEE BELOW   Final    VALUES (mg/dL) WITH LOADING DOSE OF 75g:                                 DIAGNOSTIC VALUE                                 FASTING  AT 2 HRS  ----------------------------------------------  INCREASED RISK FOR DIABETES  100-125   140-199    DIAGNOSTIC OF DIABETES         >=126     >=200     Diagnosis of diabetes mellitus requires    confirmation of an abnormal result by repeat   testing. American Diabetes Association,   Diabetes Care 2015;38(Suppl.1):S8-S16.      Orders Only on 09/13/2023   Component Date Value Ref Range Status    WBC 09/13/2023 8.0  4.4 - 11.3 x10E9/L Final    RBC 09/13/2023 4.38  4.00 - 5.20 x10E12/L Final    Hemoglobin 09/13/2023 14.0  12.0 - 16.0 g/dL Final    Hematocrit 09/13/2023 42.0  36.0 - 46.0 % Final    MCV 09/13/2023 96  80 - 100 fL Final    MCHC 09/13/2023 33.3  32.0 - 36.0 g/dL Final    Platelets 09/13/2023 325  150 - 450 x10E9/L Final    RDW 09/13/2023 12.1  11.5 - 14.5 % Final    Neutrophils % 09/13/2023 53.6  40.0 - 80.0 % Final    Immature Granulocytes %, Automated 09/13/2023 0.4  0.0 - 0.9 % Final    Comment:  Immature Granulocyte Count (IG) includes promyelocytes,    myelocytes and metamyelocytes but does not include bands.   Percent differential counts (%) should be interpreted in  the   context of the absolute cell counts (cells/L).      Lymphocytes % 09/13/2023 29.7  13.0 - 44.0 % Final    Monocytes % 09/13/2023 7.0  2.0 - 10.0 % Final    Eosinophils % 09/13/2023 8.9  0.0 - 6.0 % Final    Basophils % 09/13/2023 0.4  0.0 - 2.0 % Final    Neutrophils Absolute 09/13/2023 4.27  1.20 - 7.70 x10E9/L Final    Lymphocytes Absolute 09/13/2023 2.37  1.20 - 4.80 x10E9/L Final    Monocytes Absolute 09/13/2023 0.56  0.10 - 1.00 x10E9/L Final    Eosinophils Absolute 09/13/2023 0.71 (H)  0.00 - 0.70 x10E9/L Final    Basophils Absolute 09/13/2023 0.03  0.00 - 0.10 x10E9/L Final    IgM 09/13/2023 110  40 - 230 mg/dL Final    Comment: MONOCLONAL PROTEINS MAY CAUSE FALSELY LOW  RESULTS IN THIS ASSAY. SERUM PROTEIN  ELECTROPHORESIS SHOULD BE DONE AS THE  FIRST TEST TO EVALUATE MONOCLONAL GAMMOPATHY.      Glucose 09/13/2023 72 (L)  74 - 99 mg/dL Final    Sodium 09/13/2023 140  136 - 145 mmol/L Final    Potassium 09/13/2023 4.3  3.5 - 5.3 mmol/L Final    Chloride 09/13/2023 105  98 - 107 mmol/L Final    Bicarbonate 09/13/2023 28  21 - 32 mmol/L Final    Anion Gap 09/13/2023 11  10 - 20 mmol/L Final    Urea Nitrogen 09/13/2023 13  6 - 23 mg/dL Final    Creatinine 09/13/2023 0.91  0.50 - 1.05 mg/dL Final    GFR Female 09/13/2023 87  >90 mL/min/1.73m2 Final    Comment:  CALCULATIONS OF ESTIMATED GFR ARE PERFORMED   USING THE 2021 CKD-EPI STUDY REFIT EQUATION   WITHOUT THE RACE VARIABLE FOR THE IDMS-TRACEABLE   CREATININE METHODS.    https://jasn.asnjournals.org/content/early/2021/09/22/ASN.0975437136      Calcium 09/13/2023 9.6  8.6 - 10.3 mg/dL Final    Albumin 09/13/2023 4.7  3.4 - 5.0 g/dL Final    Alkaline Phosphatase 09/13/2023 47  33 - 110 U/L Final    Total Protein 09/13/2023 8.0  6.4 - 8.2 g/dL Final    AST 09/13/2023 16  9 - 39 U/L Final    Total Bilirubin 09/13/2023 0.5  0.0 - 1.2 mg/dL Final    ALT (SGPT) 09/13/2023 9  7 - 45 U/L Final    Comment:  Patients treated with Sulfasalazine may generate     falsely decreased results for ALT.      Total IgG 09/13/2023 1,470  700 - 1,600 mg/dL Final    Comment: MONOCLONAL PROTEINS MAY CAUSE FALSELY LOW  RESULTS IN THIS ASSAY. SERUM PROTEIN  ELECTROPHORESIS SHOULD BE DONE AS THE  FIRST TEST TO EVALUATE MONOCLONAL GAMMOPATHY.      Complement, Total (CH50) 09/13/2023 60.2  38.7 - 89.9 U/mL Final    Comment: Specimen is lipemic. Results may be adversely affected. Interpret   results with caution.  Normal activity in total complement functional assay (CH50)   suggests normal presence and function of complement components,   C1-C9. However, normal CH50 result can also occur in the presence   of low levels of complement components due to excess presence of   complement proteins in human serum. If clinically indicated,   measurement of individual complement components is recommended.   Normal CH50 result with low complement alternate pathway   functional (AH50, test code 4992318) activity suggests defects in   the alternate pathway.  REFERENCE INTERVAL: Complement Activity Total, (CH50)       38.6 U/mL or less ..........Low       38.7-89.9 U/mL .............Normal       90.0 U/mL or greater .......High  Performed By: [facility] [employer]  [address] [place] [id number]  : Giovanny Baer MD, PhD  CLIA Number: [id number]      IgA 09/13/2023 280  70 - 400 mg/dL Final    Comment: MONOCLONAL PROTEINS MAY CAUSE FALSELY LOW  RESULTS IN THIS ASSAY. SERUM PROTEIN  ELECTROPHORESIS SHOULD BE DONE AS THE  FIRST TEST TO EVALUATE MONOCLONAL GAMMOPATHY.      Serotype 1 09/13/2023 0.84  ug/mL Final    Serotype 2 09/13/2023 4.17  ug/mL Final    Serotype 3 09/13/2023 6.11  ug/mL Final    Serotype 4 09/13/2023 1.16  ug/mL Final    Serotype 5 09/13/2023 0.34  ug/mL Final    Serotype 8 09/13/2023 0.45  ug/mL Final    Serotype 9N 09/13/2023 5.75  ug/mL Final    Serotype 12F 09/13/2023 0.77  ug/mL Final    Serotype 14 09/13/2023 0.27  ug/mL Final    Serotype 17F  09/13/2023 1.16  ug/mL Final    Serotype 19F 09/13/2023 5.54  ug/mL Final    Serotype 20 09/13/2023 0.57  ug/mL Final    Serotype 22F 09/13/2023 0.51  ug/mL Final    Serotype 23F 09/13/2023 2.59  ug/mL Final    Serotype 6B(26) 09/13/2023 0.77  ug/mL Final    Serotype 10A(34) 09/13/2023 3.47  ug/mL Final    Serotype 11A(43) 09/13/2023 1.11  ug/mL Final    Serotype 7F(51) 09/13/2023 0.77  ug/mL Final    Serotype 15B(54) 09/13/2023 12.21  ug/mL Final    Serotype 18C(56) 09/13/2023 1.95  ug/mL Final    Serotype 19A(57) 09/13/2023 3.12  ug/mL Final    Serotype 9V(68) 09/13/2023 2.65  ug/mL Final    Serotype 33F(70) 09/13/2023 1.92  ug/mL Final    Pneumo Serotype Interpretation 09/13/2023 See Note   Final    Comment: INTERPRETIVE INFORMATION: Streptococcus pneumoniae Antibodies, IgG  A pre- and postvaccination comparison is required to adequately   assess the humoral immune response to the pure polysaccharide   Pneumovax 23 (PNX) and/or the protein conjugated Prevnar 7 (P7),   Prevnar 13 (P13), Prevnar 20 (P20), and Vaxneuvance (V15)   Streptococcus pneumoniae vaccines. Prevaccination samples should   be collected prior to vaccine administration. Postvaccination   samples should be obtained at least 4 weeks after immunization.   Testing of postvaccination samples alone will provide only general   immune status of the individual to various pneumococcal serotypes.  In the case of pure polysaccharide vaccine, indication of immune   system competence is further delineated as an adequate response to   at least 50 percent of the serotypes in the vaccine challenge for   those 2-5 years of age and to at least 70 percent of the serotypes   in the vaccine challenge for those 6-65 years of age. Individual   i                           mmune response may vary based on age, past exposure,   immunocompetence, and pneumococcal serotype.  Responder Status           Antibody Ratio    Nonresponder ........... Less than twofold increase and                               postvaccination concentration                              less than 1.3 ug/mL    Good responder ......... At least a twofold increase                              and/or a postvaccination                              concentration greater than or                             equal to 1.3 ug/mL  A response to 50-70 percent or more of the serotypes in the   vaccine challenge is considered a normal humoral response.(Dodie,   2014) Antibody concentration greater than 1.0-1.3 ug/mL is   generally considered long-term protection.(Dodie, 2015)  References:  1. Dodie ALLISON, Paolo BERGER, Magdiel X, et al. Multilaboratory   assessment of threshold versus fold-change algorithms for   minimizing analytical variability in multiplexed pneumococcal IgG   measurements. Clin Vaccine                            Immunol. 2014;21(7):982-988.  2. Dodie ALLISON, Paulo LILLY. Use and clinical interpretation of   pneumococcal antibody measurements in the evaluation of humoral   immune function. Clin Vaccine Immunol. 2015;22(2):148-152.  This test was developed and its performance characteristics   determined by [facility]. It has not been cleared or   approved by the U.S. Food and Drug Administration. This test was   performed in a CLIA-certified laboratory and is intended for   clinical purposes.  Performed By: [facility]  [address] [place] [id number]  : Giovanny Baer MD, PhD  CLIA Number: [id number]     Lab on 08/25/2023   Component Date Value Ref Range Status    Glucose 08/25/2023 90  74 - 99 mg/dL Final    Sodium 08/25/2023 141  136 - 145 mmol/L Final    Potassium 08/25/2023 3.9  3.5 - 5.3 mmol/L Final    Chloride 08/25/2023 107  98 - 107 mmol/L Final    Bicarbonate 08/25/2023 26  21 - 32 mmol/L Final    Anion Gap 08/25/2023 12  10 - 20 mmol/L Final    Urea Nitrogen 08/25/2023 8  6 - 23 mg/dL Final    Creatinine 08/25/2023 0.61  0.50 - 1.05 mg/dL Final    GFR Female  08/25/2023 >90  >90 mL/min/1.73m2 Final     CALCULATIONS OF ESTIMATED GFR ARE PERFORMED   USING THE 2021 CKD-EPI STUDY REFIT EQUATION   WITHOUT THE RACE VARIABLE FOR THE IDMS-TRACEABLE   CREATININE METHODS.    https://jasn.asnjournals.org/content/early/2021/09/22/ASN.5525639094    Calcium 08/25/2023 10.0  8.6 - 10.6 mg/dL Final    Albumin 08/25/2023 4.7  3.4 - 5.0 g/dL Final    Alkaline Phosphatase 08/25/2023 41  33 - 110 U/L Final    Total Protein 08/25/2023 7.4  6.4 - 8.2 g/dL Final    AST 08/25/2023 15  9 - 39 U/L Final    Total Bilirubin 08/25/2023 0.7  0.0 - 1.2 mg/dL Final    ALT (SGPT) 08/25/2023 9  7 - 45 U/L Final     Patients treated with Sulfasalazine may generate    falsely decreased results for ALT.    WBC 08/25/2023 5.8  4.4 - 11.3 x10E9/L Final    nRBC 08/25/2023 0.0  0.0 - 0.0 /100 WBC Final    RBC 08/25/2023 4.14  4.00 - 5.20 x10E12/L Final    Hemoglobin 08/25/2023 13.3  12.0 - 16.0 g/dL Final    Hematocrit 08/25/2023 41.1  36.0 - 46.0 % Final    MCV 08/25/2023 99  80 - 100 fL Final    MCHC 08/25/2023 32.4  32.0 - 36.0 g/dL Final    Platelets 08/25/2023 241  150 - 450 x10E9/L Final    RDW 08/25/2023 12.0  11.5 - 14.5 % Final    Neutrophils % 08/25/2023 48.7  40.0 - 80.0 % Final    Immature Granulocytes %, Automated 08/25/2023 0.3  0.0 - 0.9 % Final     Immature Granulocyte Count (IG) includes promyelocytes,    myelocytes and metamyelocytes but does not include bands.   Percent differential counts (%) should be interpreted in the   context of the absolute cell counts (cells/L).    Lymphocytes % 08/25/2023 38.0  13.0 - 44.0 % Final    Monocytes % 08/25/2023 7.5  2.0 - 10.0 % Final    Eosinophils % 08/25/2023 5.0  0.0 - 6.0 % Final    Basophils % 08/25/2023 0.5  0.0 - 2.0 % Final    Neutrophils Absolute 08/25/2023 2.80  1.20 - 7.70 x10E9/L Final    Lymphocytes Absolute 08/25/2023 2.19  1.20 - 4.80 x10E9/L Final    Monocytes Absolute 08/25/2023 0.43  0.10 - 1.00 x10E9/L Final    Eosinophils Absolute  08/25/2023 0.29  0.00 - 0.70 x10E9/L Final    Basophils Absolute 08/25/2023 0.03  0.00 - 0.10 x10E9/L Final     Current Outpatient Medications on File Prior to Visit   Medication Sig Dispense Refill    aprepitant (Emend) 40 mg capsule Take 1 capsule (40 mg) by mouth once daily.      budesonide, bulk, powder 0.6 mg 2 times a day. Compound 0.6 mg capsule to be added to sinus rinse twice daily. (Patient not taking: Reported on 3/4/2024) 60 g 0    predniSONE (Deltasone) 10 mg tablet Take 4 tabs (40 mg) daily for 3 days, then take 3 tabs (30 mg) daily for 3 days. Continue to decrease by 1 tab (10mg) every 3 days until gone. (Patient not taking: Reported on 3/4/2024) 30 tablet 0    traMADol (Ultram) 50 mg tablet Take 1 tablet (50 mg) by mouth every 6 hours if needed for severe pain (7 - 10). (Patient not taking: Reported on 1/11/2024) 15 tablet 0     No current facility-administered medications on file prior to visit.     No images are attached to the encounter.            Assessment/Plan   Problem List Items Addressed This Visit             ICD-10-CM    Influenza B - Primary J10.1     You are positive for flu   Tamiflu sent to pharmacy   Advised patient may use Advil and/or tylenol for pain  Encouraged patient to push fluids           Other Visit Diagnoses         Codes    Fever, unspecified fever cause     R50.9    Relevant Orders    POCT Influenza A/B manually resulted (Completed)

## 2024-03-27 NOTE — PROGRESS NOTES
ROLANDA Nuñez is a 29 y.o. old female h/o CRS, DNS, nasal obstruction/drainage s/p bilateral ESS (total), septoplasty, bilateral SOTERO, partial removal of right middle turbinate. The patient had surgery 1/3/2024.  1/11/24 - Patient presents for her 1st post op visit. She reports acute concerns. She has had a few epistaxis, mostly with bending over and moving pillows. Last nosebleed affected both sides of the nose and was on 1/9/24. The nosebleeds typically resolve with Afrin. She reports expected post operative congestion and edema. She is doing saline irrigations 4-5 times daily, which have been productive of crusts and dried/old blood. She notices some blurriness in her vision. This was present before sinus surgery. She denies constant clear fluid from nose, severe headaches, double vision, or fevers.    On 1/19/24, the patient called into office with concerns of intense right facial/ear pressure, nasal congestion mainly on the right side. Patient states her drainage since surgery has been clear with occasional blood clots but has turned to green over the last 2 days along with the facial pressure. She continues to irrigate BID with Budesonide. Discussed with Dr. Fitzpatrick who gave a verbal order for Zpak and Medrol dosepak.    1/25/24 - Patient presents for her 2nd post op visit. She notes that this was slightly helpful. She does feel that her drainage is green at this time and posterior. She is irrigating twice daily (1 bottle saline, 1 bottle budesonide twice daily). Irrigations are productive of discolored green nasal drainage. She has been getting headaches and pressure (frontal, maxillary, and can feel this in her teeth). She also feels significant fatigue. She feels that this all has been present for at least a week. She has 1 day left of the Medrol Dospak that was prescribed on 1/19. She denies excessive bleeding, constant clear fluid from nose, severe headaches, double vision, or fevers.    3/28/24 -  Patient presents for follow-up. She did not end up taking the prednisone. She finds it difficult to tolerate prednisone due to side effects, especially at higher dosages. She has been taking Tylenol. She feels over the last few days, she has started to develop a sinus infection. She notes increased pressure over her maxillary areas and behind her eyes. She has increased nasal obstruction and sneezing. She recently had influenza in the beginning of the month and was on Tamiflu. She recently lost her sense of taste, but she can still smell. She has drainage but it is mostly clear. She has very occasional discolored light green nasal drainage. She is using saline irrigations once daily. She finished the budesonide irrigations at the end of February.    Allergies: Amoxicillin, Hydrocodone, Sulfamethoxazole-trimethoprim, and Amoxicillin-pot clavulanate    Operative Report:  Date of Service: 1/3/2024  [place] [facility] [place] [facility] [place] [facility] [place]    Post-op Diagnoses:  * Chronic pansinusitis [J32.4]  * Deviated nasal septum [J34.2]  * Nasal obstruction [J34.89]  * Nasal drainage [J34.89]  * Chronic frontal sinusitis [J32.1]  * Chronic sphenoidal sinusitis [J32.3]    Operation/Procedures:  1. Bilateral endoscopic total ethmoidectomies with sphenoidotomies   2. Bilateral endoscopic frontal sinusotomies with frontal sinus explorations  3. Bilateral endoscopic maxillary antrostomies with removal of tissue from sinuses  4. Septoplasty  5. Bilateral inferior turbinate outfractures  6. Image guidance navigation - extradural    Operative Findings:  1. Chronic inflammation through all sinuses, and removal of middle turbinate partially on the right.  2. Absorbable hemostatic material in the ethmoids  3. Collado Splint sutured to the septum bilaterally    Surgeon: Suraj Fitzpatrick MD FACS  Assistant: None  EBL: 100 ml  Anesthesia: General and local    Per Hilary Thibodeaux's prior note 11/1/23:  Aretha Nuñez is a 28 y.o. old female, referred by Dr. Hussein  Candelaria,  presenting with complaints of sinus and nose problems that began about 1.5 years ago. The patient describes the sinus/nose problems as gradual onset of nasal obstruction (bilateral, worse with laying down). She also notes nasal drainage (anterior/posterior, clear). She also has frontal area pressure. She has had a significantly diminished sense of smell over the last year. Patient denies facial pain, periorbital edema, throat clearing, hoarseness, loss of taste. The patient denies epistaxis. The patient has taken antibiotics, claritin medications to alleviate the problem. She has tried Flonase, Vicks. The medication Claritin has helped. The patient has tried nasal saline irrigations. Does not have a history of allergic rhinitis or allergies. Allergy testing was negative. They deny a history of aspirin sensitivity. They report 2 sinus infections per year requiring antibiotic treatment. Most recent antibiotic usage includes: Doxycycline (current) x 20 days (on day 14).    History of prior nasal/sinus surgery or procedure: Denies    Review of Systems   Negative for constitutional, eyes, cardiac, pulmonary, hepatic, renal, digestive, hematologic, epileptic, syncopal, musculoskeletal, mental health, integumentary, hypertensive, lipid, arthritic, diabetic, thyroid or neurologic disorders (except as listed in the HPI, PMH and Problem List).    Assessment   Aretha Nuñez is a 29 y.o. female h/o CRS, DNS, nasal obstruction/drainage s/p bilateral ESS (total), septoplasty, bilateral SOTERO, partial removal of right middle turbinate. The patient had surgery 1/3/2024.  1/11/24 - 1st post op. Is having persistent bleeding episodes. Expected post op swelling today. Debridement performed as noted. Rx budesonide irrigations BID x 1 month. Continue saline irrigations. Advised on Afrin usage and pinching nose and continuing restrictions until 2 weeks post op then gradual increase in activity thereafter.  1/25/24 - 2nd post  op. Completed Zpak and medrol. Pt still reports extreme fatigue. No infection but significant post-surgical edema.  Continue budesonide irrigations BID. Rx pred x 12 days.  3/28/24 - No infection or edema. Mainly clear drainage. Start fluticasone and azelastine. Follow up with Hilary Thibodeaux.    Plan   Nasal endoscopy. Findings: as noted.  The patient completed the budesonide irrigations.  Patient was prescribed Flonase nasal spray, 1 spray in each nostril BID. Patient was instructed on the proper technique of aiming towards the lateral wall of the nose on each side.  Patient was prescribed azelastine nasal spray, up to a maximum of 2 sprays in each nostril BID. Patient was instructed on the proper technique of aiming towards the lateral wall of the nose on each side.  Follow up with Hilary Thibodeaux in 2 months.    Referring Provider: Hilary Thibodeaux CNP  I will provide a report to the referring provider via the electronic medical record or [place] mail.    Suraj Fitzpatrick MD [facility]  Division of Rhinology, Sinus, and Skull Base Surgery       Exam   General: This is a healthy appearing female who appears her stated age. The patient is alert and appropriately verbally conversant without hoarseness.  Face: The face was inspected and no cutaneous masses or lesions were visualized. There was no erythema or edema noted. Facial movement was symmetric without weakness. No skin lesions were detected.  Eyes: Extra-ocular muscle function was intact. No nystagmus was observed. Pupils were equal.    Nose: Examination of the nose revealed the nasal dorsum to be midline. Intranasal exam reveals the septum is healthy without perforation. The inferior turbinates were normal. See below procedure note as applicable for further exam.    Procedure Note:  Procedure: Nasal endoscopy  - diagnostic  Indication: Chronic rhinosinusitis, post operative from sinus surgery  Informed consent obtained: risks, benefits, alternatives, and  expectations discussed with patient and the patient wishes to proceed.    Findings: After anesthesia and decongestion with topical lidocaine and Afrin spray, the nasal cavities were examined with a zero and/or 30-degree endoscope. Sphenoidotomies are clear. The maxillary and ethmoid sinuses are widely patent. The frontal recesses were clear. Left and right frontal sinusotomy is patent. No pus or polyps were noted. There is no CSF leak. Things are healing well. The patient tolerated the procedure well and there were no complications.       Scribe Attestation  By signing my name below, I, Lucio Gardner, attest that this documentation has been prepared under the direction and in the presence of Suraj Fitzpatrick MD. All medical record entries made by the Scribe were at my direction or personally dictated by me. I have reviewed the chart and agree that the record accurately reflects my personal performance of the history, physical exam, discussion and plan.

## 2024-03-27 NOTE — PATIENT INSTRUCTIONS
PATIENT INSTRUCTIONS ARE COMPLETE and READY TO PRINT    Since your main issue today seems to be clear drainage, I recommend starting the following nasal sprays. Use both sprays back-to-back for the next month.    Azelastine:  Start using azelastine nasal spray. Use up to a maximum of 2 sprays in each nostril twice a day. Nasal spray instructions are below.    Flonase:  Start using Flonase nasal spray. Do 1 spray in each nostril twice a day. There is less nasal drying when you space out Flonase to one spray in the morning and one spray in the evening. If that is not possible, you can instead do 2 sprays in each nostril once a day. Nasal spray instructions are below.    Nasal spray instructions:  Please take the prescribed nasal spray as directed. BE SURE TO POINT THE SPRAY SLIGHTLY OUTWARDS TOWARD THE CORNER OF THE EYE ON THE SAME SIDE NOSTRIL. This will ensure you are treating the appropriate parts of your nose that are swollen or inflamed. Also, avoid sniffing in the spray through your nose as this will cause the spray to go towards the back of your nose and down the back of your throat. Instead, blow out through your mouth while spraying into your nostril. This elevates the soft palate in the back of your mouth, which helps the spray to stay in your nose. After spraying, if the fluid starts dripping out of your nose, you can sniff gently to keep the fluid in your nose. This medication can take up to 1 month before you notice full benefit. You MUST use it every day for it to be effective.    If you develop thick yellow green drainage or facial pressure, please contact our office by calling [phone].    Stopping a Nosebleed:  If you get a nosebleed that does not stop easily, please squirt 3-4 sprays of Afrin, oxymetazoline, or Anuj-Synephrine into the bleeding nostril, and pinch your nostrils together for 10 minutes (across the soft part of the nose).  If bleeding persists, squirt 3-4 more sprays of Afrin,  oxymetazoline, or Anuj-Synephrine into the bleeding nostril, and pinch your nostrils together for another 10 minutes.  If the nosebleed does not stop after 3 attempts with pinching and the decongestant spray, call our office at [phone]. If you are unable to get in touch with us, please go to the Emergency Room.    Follow up:  Please follow up with one of my nurse practitioners Hilary Thibodeaux in 2 months for reevaluation or sooner with any questions or concerns. Please feel free to contact her office at [phone] with any questions.    Scribe Attestation  By signing my name below, I, Lucio Gardner, attest that this documentation has been prepared under the direction and in the presence of Suraj Fitzpatrick MD. All medical record entries made by the Scribe were at my direction or personally dictated by me. I have reviewed the chart and agree that the record accurately reflects my personal performance of the history, physical exam, discussion and plan.

## 2024-03-28 ENCOUNTER — OFFICE VISIT (OUTPATIENT)
Dept: OTOLARYNGOLOGY | Facility: CLINIC | Age: 29
End: 2024-03-28
Payer: COMMERCIAL

## 2024-03-28 VITALS — BODY MASS INDEX: 20.91 KG/M2 | HEIGHT: 63 IN | WEIGHT: 118 LBS

## 2024-03-28 DIAGNOSIS — J34.89 NASAL OBSTRUCTION: ICD-10-CM

## 2024-03-28 DIAGNOSIS — J34.89 NASAL AND SINUS DISCHARGE: Primary | ICD-10-CM

## 2024-03-28 DIAGNOSIS — J32.4 CHRONIC PANSINUSITIS: ICD-10-CM

## 2024-03-28 PROBLEM — R50.9 FEVER: Status: ACTIVE | Noted: 2024-03-28

## 2024-03-28 PROCEDURE — 31231 NASAL ENDOSCOPY DX: CPT | Performed by: OTOLARYNGOLOGY

## 2024-03-28 PROCEDURE — 99024 POSTOP FOLLOW-UP VISIT: CPT | Performed by: OTOLARYNGOLOGY

## 2024-03-28 PROCEDURE — 1036F TOBACCO NON-USER: CPT | Performed by: OTOLARYNGOLOGY

## 2024-03-28 RX ORDER — AZELASTINE 1 MG/ML
2 SPRAY, METERED NASAL 2 TIMES DAILY
Qty: 30 ML | Refills: 3 | Status: SHIPPED | OUTPATIENT
Start: 2024-03-28 | End: 2024-04-23

## 2024-03-28 RX ORDER — FLUTICASONE PROPIONATE 50 MCG
2 SPRAY, SUSPENSION (ML) NASAL DAILY
Qty: 16 G | Refills: 3 | Status: SHIPPED | OUTPATIENT
Start: 2024-03-28 | End: 2024-04-23

## 2024-04-20 DIAGNOSIS — J34.89 NASAL AND SINUS DISCHARGE: ICD-10-CM

## 2024-04-20 DIAGNOSIS — J34.89 NASAL OBSTRUCTION: ICD-10-CM

## 2024-04-23 RX ORDER — AZELASTINE 1 MG/ML
SPRAY, METERED NASAL
Qty: 90 ML | Refills: 2 | Status: SHIPPED | OUTPATIENT
Start: 2024-04-23

## 2024-04-23 RX ORDER — FLUTICASONE PROPIONATE 50 MCG
2 SPRAY, SUSPENSION (ML) NASAL DAILY
Qty: 48 ML | Refills: 2 | Status: SHIPPED | OUTPATIENT
Start: 2024-04-23 | End: 2024-05-07

## 2024-05-07 DIAGNOSIS — J34.89 NASAL OBSTRUCTION: ICD-10-CM

## 2024-05-07 RX ORDER — FLUTICASONE PROPIONATE 50 MCG
2 SPRAY, SUSPENSION (ML) NASAL DAILY
Qty: 144 ML | Refills: 1 | Status: SHIPPED | OUTPATIENT
Start: 2024-05-07 | End: 2024-06-06

## 2024-07-09 ENCOUNTER — LAB (OUTPATIENT)
Dept: LAB | Facility: LAB | Age: 29
End: 2024-07-09
Payer: COMMERCIAL

## 2024-07-09 DIAGNOSIS — R73.09 OTHER ABNORMAL GLUCOSE: Primary | ICD-10-CM

## 2024-07-09 LAB
ALBUMIN SERPL BCP-MCNC: 4.5 G/DL (ref 3.4–5)
ALP SERPL-CCNC: 44 U/L (ref 33–110)
ALT SERPL W P-5'-P-CCNC: 9 U/L (ref 7–45)
ANION GAP SERPL CALC-SCNC: 11 MMOL/L (ref 10–20)
AST SERPL W P-5'-P-CCNC: 17 U/L (ref 9–39)
BILIRUB SERPL-MCNC: 0.8 MG/DL (ref 0–1.2)
BUN SERPL-MCNC: 10 MG/DL (ref 6–23)
C PEPTIDE SERPL-MCNC: 1.2 NG/ML (ref 0.7–3.9)
CALCIUM SERPL-MCNC: 9.5 MG/DL (ref 8.6–10.6)
CHLORIDE SERPL-SCNC: 106 MMOL/L (ref 98–107)
CO2 SERPL-SCNC: 26 MMOL/L (ref 21–32)
CORTIS SERPL-MCNC: 12 UG/DL (ref 2.5–20)
CREAT SERPL-MCNC: 0.65 MG/DL (ref 0.5–1.05)
DHEA-S SERPL-MCNC: 257 UG/DL (ref 12–379)
EGFRCR SERPLBLD CKD-EPI 2021: >90 ML/MIN/1.73M*2
GLUCOSE SERPL-MCNC: 89 MG/DL (ref 74–99)
POTASSIUM SERPL-SCNC: 4 MMOL/L (ref 3.5–5.3)
PROT SERPL-MCNC: 7.5 G/DL (ref 6.4–8.2)
SODIUM SERPL-SCNC: 139 MMOL/L (ref 136–145)
T4 FREE SERPL-MCNC: 1.35 NG/DL (ref 0.78–1.48)
TSH SERPL-ACNC: 1.03 MIU/L (ref 0.44–3.98)

## 2024-07-09 PROCEDURE — 36415 COLL VENOUS BLD VENIPUNCTURE: CPT

## 2024-07-09 PROCEDURE — 83525 ASSAY OF INSULIN: CPT

## 2024-07-09 PROCEDURE — 84681 ASSAY OF C-PEPTIDE: CPT

## 2024-07-09 PROCEDURE — 80053 COMPREHEN METABOLIC PANEL: CPT

## 2024-07-09 PROCEDURE — 82024 ASSAY OF ACTH: CPT

## 2024-07-09 PROCEDURE — 84439 ASSAY OF FREE THYROXINE: CPT

## 2024-07-09 PROCEDURE — 84443 ASSAY THYROID STIM HORMONE: CPT

## 2024-07-09 PROCEDURE — 83527 ASSAY OF INSULIN: CPT

## 2024-07-09 PROCEDURE — 82627 DEHYDROEPIANDROSTERONE: CPT

## 2024-07-09 PROCEDURE — 82533 TOTAL CORTISOL: CPT

## 2024-07-10 LAB — ACTH PLAS-MCNC: 16 PG/ML (ref 7.2–63.3)

## 2024-07-13 LAB
INSULIN FREE SERPL-ACNC: 3 UIU/ML (ref 3–25)
INSULIN SERPL-ACNC: 5 UIU/ML (ref 3–25)

## 2024-09-10 ENCOUNTER — TELEMEDICINE (OUTPATIENT)
Dept: PRIMARY CARE | Facility: CLINIC | Age: 29
End: 2024-09-10
Payer: COMMERCIAL

## 2024-09-10 DIAGNOSIS — J01.01 ACUTE RECURRENT MAXILLARY SINUSITIS: Primary | ICD-10-CM

## 2024-09-10 PROCEDURE — 1036F TOBACCO NON-USER: CPT | Performed by: STUDENT IN AN ORGANIZED HEALTH CARE EDUCATION/TRAINING PROGRAM

## 2024-09-10 PROCEDURE — 99213 OFFICE O/P EST LOW 20 MIN: CPT | Performed by: STUDENT IN AN ORGANIZED HEALTH CARE EDUCATION/TRAINING PROGRAM

## 2024-09-10 RX ORDER — DOXYCYCLINE 100 MG/1
100 CAPSULE ORAL 2 TIMES DAILY
Qty: 14 CAPSULE | Refills: 0 | Status: SHIPPED | OUTPATIENT
Start: 2024-09-10 | End: 2024-09-17

## 2024-09-10 ASSESSMENT — ENCOUNTER SYMPTOMS
SINUS PAIN: 1
DIZZINESS: 0
FEVER: 0
SHORTNESS OF BREATH: 0
RHINORRHEA: 1
COUGH: 0
LIGHT-HEADEDNESS: 0
SINUS PRESSURE: 1
CHILLS: 0
SORE THROAT: 1
HEADACHES: 0
FATIGUE: 0
ABDOMINAL PAIN: 0

## 2024-09-10 NOTE — PROGRESS NOTES
Subjective   Patient ID: Aretha Nuñez is a 29 y.o. female who presents for Sinusitis (Stuffy, facial pressure X yesterday ).    Sinusitis  Associated symptoms include congestion, sinus pressure and a sore throat. Pertinent negatives include no chills, coughing, headaches or shortness of breath.        Sinus surgery in January 2024.  She has been good since that time.  Yesterday she started having more symptoms.  Started with a sore throat. Then more drainage.  She has still been using the Nettipot regularly like she has.  However, she still feels like it is getting backed up.  She states this feels just like her having one of her previous sinus infections.  She states that she is shanta be going on a trip in the next 3 days and has to fly and wants to make sure that she has everything that she needs to be rid of her symptoms.    Review of Systems   Constitutional:  Negative for chills, fatigue and fever.   HENT:  Positive for congestion, postnasal drip, rhinorrhea, sinus pressure, sinus pain (worse on the left side) and sore throat.    Respiratory:  Negative for cough and shortness of breath.    Cardiovascular:  Negative for chest pain.   Gastrointestinal:  Negative for abdominal pain.   Neurological:  Negative for dizziness, light-headedness and headaches.       Objective   There were no vitals taken for this visit.    Physical Exam    Assessment/Plan   Problem List Items Addressed This Visit    None  Visit Diagnoses         Codes    Acute recurrent maxillary sinusitis    -  Primary J01.01    Relevant Medications    doxycycline (Vibramycin) 100 mg capsule          History as above.  Patient likely with recurrent episode of sinusitis.  Sent in a course of doxycycline given her current allergies.  She did have sinus surgery already which has overall helped and she states this is her first episode since January 2024.  She does fly the next 3 days which is why she is try to be aggressive.  Normally she states she would  try to wait it out with her other prescription nasal sprays and Beulah pot but she wants to make sure that she is not having symptoms before she flies.  Discussed signs and symptoms to follow-up in the office.  She will call in sooner with other acute concerns or complaints.    This visit was completed via telemedicine (video) call due to the restrictions of the COVID global pandemic. The visit was conducted between Aretha Nuñez, [facility]. and myself, Al Rivera DO, location [place]. The patient verbally consented to the telehealth visit and verbally confirmed their location. All issues were discussed and addressed as in the clinical documentation, but no physical exam was performed. If it was felt that the patient should be evaluated in a clinical setting, then they were directed there.  Spent 6:30 minutes with the patient over the telemedicine call and more than 50% of this time was spent in counseling and coordination of care.

## 2024-10-25 ENCOUNTER — OFFICE VISIT (OUTPATIENT)
Dept: OTOLARYNGOLOGY | Facility: CLINIC | Age: 29
End: 2024-10-25
Payer: COMMERCIAL

## 2024-10-25 VITALS — HEIGHT: 63 IN | BODY MASS INDEX: 20.91 KG/M2 | WEIGHT: 118 LBS

## 2024-10-25 DIAGNOSIS — J34.89 NASAL AND SINUS DISCHARGE: ICD-10-CM

## 2024-10-25 DIAGNOSIS — R44.8 FACIAL PRESSURE: ICD-10-CM

## 2024-10-25 DIAGNOSIS — J32.8 OTHER CHRONIC SINUSITIS: ICD-10-CM

## 2024-10-25 DIAGNOSIS — J32.4 CHRONIC PANSINUSITIS: Primary | ICD-10-CM

## 2024-10-25 PROCEDURE — 87070 CULTURE OTHR SPECIMN AEROBIC: CPT

## 2024-10-25 PROCEDURE — 87205 SMEAR GRAM STAIN: CPT

## 2024-10-25 PROCEDURE — 31231 NASAL ENDOSCOPY DX: CPT | Performed by: NURSE PRACTITIONER

## 2024-10-25 PROCEDURE — 3008F BODY MASS INDEX DOCD: CPT | Performed by: NURSE PRACTITIONER

## 2024-10-25 PROCEDURE — 1036F TOBACCO NON-USER: CPT | Performed by: NURSE PRACTITIONER

## 2024-10-25 PROCEDURE — 87075 CULTR BACTERIA EXCEPT BLOOD: CPT

## 2024-10-25 PROCEDURE — 99214 OFFICE O/P EST MOD 30 MIN: CPT | Performed by: NURSE PRACTITIONER

## 2024-10-25 RX ORDER — BUDESONIDE
0.6 POWDER (GRAM) MISCELLANEOUS 2 TIMES DAILY
Qty: 180 G | Refills: 0 | Status: SHIPPED | OUTPATIENT
Start: 2024-10-25

## 2024-10-25 NOTE — PROGRESS NOTES
Subjective   Patient ID: Aretha Nuñez is a 29 y.o. female who presents for Follow-up.  HPI  10/25/24- Patient of Dr. Suraj Fitzpatrick presenting for follow-up. Patient has history of FESS on 1/3/24. Patient presents for follow-up. She notes in Sept. She felt that she got a sinus infection that has not resolved. She has green drainage and blockage on the left side. She had some improvement with use of oral antibiotics. She had a 10 day course of doxycycline in Sept by her PCP. She had some improvement during this but symptoms have lingered on. She continues to irrigate twice daily.     Review of Systems  Review of systems is negative for constitutional, ophthalmological, cardiac, pulmonary, renal, gastrointestinal, musculoskeletal, mental health, endocrine, or neurologic disorders (except as listed in the HPI, PMH, and Problem List).     Objective   Physical Exam  CONSTITUTIONAL: Vital signs reviewed. Patient appears well developed and well nourished.   GENERAL: this is a healthy appearing female who appears stated age. The patient is alert and appropriately verbally conversant without hoarseness. This patient is in no apparent distress.   FACE: The face was inspected and no cutaneous masses or lesions were visualized. There was no erythema or edema noted. Facial movement was symmetric.   EYES: Extra-ocular muscle function was intact. No nystagmus was observed. Pupils were equal.     NOSE: Examination of the nose revealed the nasal dorsum to be midline. Intranasal exam reveals the septum is midline. The inferior turbinates were hypertrophic. No masses, polyps, mucopus, or other lesion on anterior rhinoscopy. See below procedure note as applicable for further exam.    RESPIRATORY: Normal inspiration and expiration and chest wall expansion, no use of accessory muscles to breathe, no stridor.  NEUROLOGICAL: Patient is ambulatory without assist. Mentation is clear. Answering questions appropriately.     Nasal / sinus  endoscopy    Date/Time: 10/25/2024 11:40 AM    Performed by: CHYNA Nelson  Authorized by: CHYNA Nelson    Consent:     Consent obtained:  Verbal    Consent given by:  Patient    Risks discussed:  Bleeding, infection and pain    Alternatives discussed:  No treatment, observation and delayed treatment  Procedure details:     Indications: assessment of airway and sino-nasal symptoms      Medication:  Afrin and Anuj-Synephrine 2%    Instrument: flexible fiberoptic nasal endoscope      Scope location: bilateral nare    Post-procedure details:     Patient tolerance of procedure:  Tolerated well, no immediate complications  Comments:      Findings: After topical decongestion with decongestant and anesthetic spray, nasal endoscopy was performed using an endoscope. The septum was midline. The inferior turbinates were hypertrophic.  Sphenoidotomies are clear. The maxillary and ethmoid sinuses are widely patent. On the left, the maxillary sinus is edematous, draining purulence down the nasopharynx. This was cultured today. On the right, mild edema to the maxillary and frontal sinuses. The frontal recesses were clear. Left and right frontal sinusotomy is patent. No pus or polyps were noted. There is no CSF leak. Things are healing well. The patient tolerated the procedure well and there were no complications. The nasal passageway is patent. The nasopharynx was clear. There were no complications and the patient tolerated the procedure well.        Assessment/Plan     Aretha Nuñez is a 29 y.o. female h/o CRS, DNS, nasal obstruction/drainage s/p bilateral ESS (total), septoplasty, bilateral SOTERO, partial removal of right middle turbinate. The patient had surgery 1/3/2024.  1/11/24 - 1st post op. Is having persistent bleeding episodes. Expected post op swelling today. Debridement performed as noted. Rx budesonide irrigations BID x 1 month. Continue saline irrigations. Advised on Afrin usage and  pinching nose and continuing restrictions until 2 weeks post op then gradual increase in activity thereafter.  1/25/24 - 2nd post op. Completed Zpak and medrol. Pt still reports extreme fatigue. No infection but significant post-surgical edema.  Continue budesonide irrigations BID. Rx pred x 12 days.  3/28/24 - No infection or edema. Mainly clear drainage. Start fluticasone and azelastine. Follow up with Hilary Thibodeaux.  10/25/24- Infection to the left max. After course of doxycycline. Declines oral pred. And oral antibiotics pending culture results.        Plan  Nasal endoscopy. Findings: as noted.  Advised to resume budesonide. Nasal irrigations ordered. Order placed to [facility] for budesonide twice a day per nasal irrigation with 0 refills. Patient notified compounding pharmacy will call patient for further instruction and to obtain additional information. Patient instructed to call with any further questions.   Culture sent from the left maxillary antrostomy. Will treat with topical antibiotic therapy pending results. Patient instructed to call if symptoms worsen for oral antibiotic therapy.  Follow up in 3 months or sooner if needed.

## 2024-10-25 NOTE — PATIENT INSTRUCTIONS
Today you were evaluated by Hilary Thibodeaux CNP.    Please follow-up in 3 months or sooner if needed. If you have any questions or concerns, please contact my office at [phone].     Will send culture and call with results.    BUDESONIDE IRRIGATIONS:  Please start using BUDESONIDE nasal irrigations: mix 1 vial of budesonide (0.5 mg or 2 ml) in 4 oz of SALINE solution. Mix gently. Use 1/2 of mixture (2 oz) in each nostril twice daily.

## 2024-10-27 LAB
BACTERIA SPEC CULT: NORMAL
GRAM STN SPEC: NORMAL
GRAM STN SPEC: NORMAL

## 2024-10-28 LAB
BACTERIA SPEC CULT: NORMAL
GRAM STN SPEC: NORMAL
GRAM STN SPEC: NORMAL

## 2024-10-29 ENCOUNTER — TELEPHONE (OUTPATIENT)
Dept: OTOLARYNGOLOGY | Facility: CLINIC | Age: 29
End: 2024-10-29
Payer: COMMERCIAL

## 2024-10-29 DIAGNOSIS — J32.4 CHRONIC PANSINUSITIS: Primary | ICD-10-CM

## 2024-10-29 DIAGNOSIS — J34.89 NASAL AND SINUS DISCHARGE: ICD-10-CM

## 2024-10-29 RX ORDER — CLINDAMYCIN HCL
POWDER (GRAM) MISCELLANEOUS
Qty: 60 G | Refills: 0 | Status: SHIPPED | OUTPATIENT
Start: 2024-10-29

## 2024-12-20 ENCOUNTER — TELEPHONE (OUTPATIENT)
Dept: OTOLARYNGOLOGY | Facility: CLINIC | Age: 29
End: 2024-12-20
Payer: COMMERCIAL

## 2024-12-20 DIAGNOSIS — J34.89 NASAL AND SINUS DISCHARGE: ICD-10-CM

## 2024-12-20 DIAGNOSIS — J32.4 CHRONIC PANSINUSITIS: Primary | ICD-10-CM

## 2024-12-20 DIAGNOSIS — J34.89 NASAL OBSTRUCTION: ICD-10-CM

## 2024-12-20 RX ORDER — DOXYCYCLINE 100 MG/1
100 TABLET ORAL 2 TIMES DAILY
Qty: 28 TABLET | Refills: 0 | Status: SHIPPED | OUTPATIENT
Start: 2024-12-20 | End: 2025-01-03

## 2024-12-20 NOTE — TELEPHONE ENCOUNTER
Patient calling with concerns that she continues to experience posterior drainage since completion of the budesonide and mupirocin irrigations. She has had benefit with oral doxycycline. She is requesting oral antibiotics. Advised to begin oral doxycycline, BID, x 14 days. Advised to take with food and discontinue for adverse effects. She verbalizes understanding instructions and agrees with established plan of care.

## 2025-01-31 ENCOUNTER — APPOINTMENT (OUTPATIENT)
Dept: OTOLARYNGOLOGY | Facility: CLINIC | Age: 30
End: 2025-01-31
Payer: COMMERCIAL

## 2025-01-31 VITALS — HEIGHT: 62 IN | BODY MASS INDEX: 20.24 KG/M2 | WEIGHT: 110 LBS

## 2025-01-31 DIAGNOSIS — J34.89 NASAL AND SINUS DISCHARGE: ICD-10-CM

## 2025-01-31 DIAGNOSIS — J32.2 CHRONIC ETHMOIDAL SINUSITIS: ICD-10-CM

## 2025-01-31 DIAGNOSIS — J32.4 CHRONIC PANSINUSITIS: Primary | ICD-10-CM

## 2025-01-31 PROCEDURE — 31231 NASAL ENDOSCOPY DX: CPT | Performed by: NURSE PRACTITIONER

## 2025-01-31 PROCEDURE — 3008F BODY MASS INDEX DOCD: CPT | Performed by: NURSE PRACTITIONER

## 2025-01-31 PROCEDURE — 99213 OFFICE O/P EST LOW 20 MIN: CPT | Performed by: NURSE PRACTITIONER

## 2025-01-31 PROCEDURE — 1036F TOBACCO NON-USER: CPT | Performed by: NURSE PRACTITIONER

## 2025-01-31 RX ORDER — FLUTICASONE PROPIONATE 93 UG/1
SPRAY, METERED NASAL
Qty: 16 ML | Refills: 11 | Status: SHIPPED | OUTPATIENT
Start: 2025-01-31

## 2025-01-31 NOTE — PATIENT INSTRUCTIONS
Today you were evaluated by Hilary Thibodeaux CNP.    Please follow-up in 6 months or sooner if needed. If you have any questions or concerns, please contact my office at [phone].     We have ordered Xhance, a steroid nasal spray, for you to begin. This medication is administered 1 spray to each nostril twice daily via a specialized delivery device that helps get the medication further into your nose.     Please watch the demonstration video at [email or web address] prior to starting the medication.    Your prescription will be filled by [facility].  They will call you and proceed with further instructions.     If you have not heard from them within 48 hours, please contact my office to assist you.    Continue budesonide once nightly for 1 month.    Then just xhance twice daily.

## 2025-01-31 NOTE — PROGRESS NOTES
Subjective   Patient ID: Aretha Nuñez is a 29 y.o. female who presents for No chief complaint on file..  HPI  1/31/25- Patient presents for follow-up. She notes that she took the doxycycline in Dec which minimally improved her symptoms. She notes that she had COVID in the beginning of the month and has constant obstruction on the left side worse than the right. She does have significant posterior drainage. She is currently using the budesonide irrigations.     Review of Systems  Review of systems is negative for constitutional, ophthalmological, cardiac, pulmonary, renal, gastrointestinal, musculoskeletal, mental health, endocrine, or neurologic disorders (except as listed in the HPI, PMH, and Problem List).     Objective   Physical Exam  CONSTITUTIONAL: Patient appears well developed and well nourished.   GENERAL: this is a healthy appearing female who appears stated age. The patient is alert and appropriately verbally conversant without hoarseness. This patient is in no apparent distress.   FACE: The face was inspected and no cutaneous masses or lesions were visualized. There was no erythema or edema noted. Facial movement was symmetric.   EYES: Extra-ocular muscle function was intact. No nystagmus was observed. Pupils were equal.     NOSE: Examination of the nose revealed the nasal dorsum to be midline. Intranasal exam reveals the septum is midline. The inferior turbinates were hypertrophic. No masses, polyps, mucopus, or other lesion on anterior rhinoscopy. See below procedure note as applicable for further exam.    RESPIRATORY: Normal inspiration and expiration and chest wall expansion, no use of accessory muscles to breathe, no stridor.  NEUROLOGICAL: Patient is ambulatory without assist. Mentation is clear. Answering questions appropriately.     Nasal / sinus endoscopy    Date/Time: 1/31/2025 9:59 AM    Performed by: CHYNA Nelson  Authorized by: CHYNA Nelson    Consent:      Consent obtained:  Verbal    Consent given by:  Patient    Risks discussed:  Bleeding, infection and pain    Alternatives discussed:  No treatment, observation and delayed treatment  Procedure details:     Indications: assessment of airway and sino-nasal symptoms      Medication:  Afrin and Anuj-Synephrine 2%    Instrument: flexible fiberoptic nasal endoscope      Scope location: bilateral nare    Post-procedure details:     Patient tolerance of procedure:  Tolerated well, no immediate complications  Comments:      Findings: After topical decongestion with decongestant and anesthetic spray, nasal endoscopy was performed using an endoscope. The septum was midline. The inferior turbinates were hypertrophic.  Sphenoidotomies are clear. The maxillary and ethmoid sinuses are widely patent. On the right, mild edema to the maxillary and frontal sinuses. The left maxillary sinus is with minimal edema and no purulence. The frontal recesses were clear. Left and right frontal sinusotomy is patent. No pus or polyps were noted. There is no CSF leak. Things are healing well. The patient tolerated the procedure well and there were no complications. The nasal passageway is patent. The nasopharynx was clear. There were no complications and the patient tolerated the procedure well.        Assessment/Plan     Aretha Nuñez is a 29 y.o. female h/o CRS, DNS, nasal obstruction/drainage s/p bilateral ESS (total), septoplasty, bilateral SOTERO, partial removal of right middle turbinate. The patient had surgery 1/3/2024.  1/11/24 - 1st post op. Is having persistent bleeding episodes. Expected post op swelling today. Debridement performed as noted. Rx budesonide irrigations BID x 1 month. Continue saline irrigations. Advised on Afrin usage and pinching nose and continuing restrictions until 2 weeks post op then gradual increase in activity thereafter.  1/25/24 - 2nd post op. Completed Zpak and medrol. Pt still reports extreme fatigue. No infection  but significant post-surgical edema.  Continue budesonide irrigations BID. Rx pred x 12 days.  3/28/24 - No infection or edema. Mainly clear drainage. Start fluticasone and azelastine. Follow up with Hilary Thibodeaux.  10/25/24- Infection to the left max. After course of doxycycline. Declines oral pred. And oral antibiotics pending culture results.  1/31/25- No infection. Mild edema to the bilateral max and frontals s/p COVID. Will continue budesonide once daily and add Xhance.        Plan  Nasal endoscopy. Findings: as noted.  Advised to continue budesonide, just once daily in the evenings for the next month.  Recommended she begin Xhance nasal spray. Advised to use 1 spray twice daily.  She may use saline irrigations as needed.  Follow up in 6 months or sooner if needed.

## 2025-03-11 DIAGNOSIS — J32.4 CHRONIC PANSINUSITIS: ICD-10-CM

## 2025-03-11 RX ORDER — DOXYCYCLINE 100 MG/1
100 TABLET ORAL 2 TIMES DAILY
Qty: 20 TABLET | Refills: 0 | Status: SHIPPED | OUTPATIENT
Start: 2025-03-11 | End: 2025-03-21

## 2025-03-11 NOTE — TELEPHONE ENCOUNTER
Patient contacted the office today stating she got back from [place] with symptoms of increased nasal congestion with green drainage and left sided pain and pressure behind her cheek and in her forehead for 5 days. She denies fever or cough. She is no longer using Xhance. She is irrigating with saline once daily and using Vicks sinus medication, which provides some relief. Discussed with MARIN Thibodeaux who placed a verbal order for Doxycycline 100 mg PO BID x 10 days. Medication education provided to patient, advised patient to take antibiotic after meals, avoid dairy products 2 hours before and after administration, and encouraged a daily probiotic while on antibiotic. Patient educated on antibiotic causing sunlight sensitivity to skin and encouraged SPF use. Advised patient to discontinue medications if adverse effects. Patient agrees to plan of care and prescription sent to pharmacy.

## 2025-07-28 ENCOUNTER — TELEPHONE (OUTPATIENT)
Dept: PRIMARY CARE | Facility: CLINIC | Age: 30
End: 2025-07-28
Payer: COMMERCIAL

## 2025-07-28 DIAGNOSIS — Z00.00 ROUTINE HEALTH MAINTENANCE: ICD-10-CM

## 2025-08-08 ENCOUNTER — APPOINTMENT (OUTPATIENT)
Dept: OTOLARYNGOLOGY | Facility: CLINIC | Age: 30
End: 2025-08-08
Payer: COMMERCIAL

## 2025-08-08 DIAGNOSIS — J32.4 CHRONIC PANSINUSITIS: Primary | ICD-10-CM

## 2025-08-08 DIAGNOSIS — J34.89 NASAL OBSTRUCTION: ICD-10-CM

## 2025-08-08 DIAGNOSIS — J34.89 NASAL AND SINUS DISCHARGE: ICD-10-CM

## 2025-08-08 PROCEDURE — 31231 NASAL ENDOSCOPY DX: CPT | Performed by: NURSE PRACTITIONER

## 2025-08-08 PROCEDURE — 99213 OFFICE O/P EST LOW 20 MIN: CPT | Performed by: NURSE PRACTITIONER

## 2025-08-08 RX ORDER — AZELASTINE HYDROCHLORIDE, FLUTICASONE PROPIONATE 137; 50 UG/1; UG/1
1 SPRAY, METERED NASAL 2 TIMES DAILY
Qty: 30 ML | Refills: 11 | Status: SHIPPED | OUTPATIENT
Start: 2025-08-08 | End: 2025-09-07

## 2025-08-08 NOTE — PROGRESS NOTES
Subjective   Patient ID: Aretha Nuñez is a 30 y.o. female who presents for No chief complaint on file..  HPI  8/8/25- Patient presents for follow-up. Over the last several weeks, she has felt the swelling returned and started taking allergy medications which has helped. She has constant clear drainage. She has left sided fullness and obstruction. She has not had any sinus infections. She is not using any nasal steroids.     Review of Systems  Review of systems is negative for constitutional, ophthalmological, cardiac, pulmonary, renal, gastrointestinal, musculoskeletal, mental health, endocrine, or neurologic disorders (except as listed in the HPI, PMH, and Problem List).     Objective   Physical Exam  CONSTITUTIONAL: Patient appears well developed and well nourished.   GENERAL: this is a healthy appearing female who appears stated age. The patient is alert and appropriately verbally conversant without hoarseness. This patient is in no apparent distress.   FACE: The face was inspected and no cutaneous masses or lesions were visualized. There was no erythema or edema noted. Facial movement was symmetric.   EYES: Extra-ocular muscle function was intact. No nystagmus was observed. Pupils were equal.     NOSE: Examination of the nose revealed the nasal dorsum to be midline. Intranasal exam reveals the septum is midline. The inferior turbinates were hypertrophic. No masses, polyps, mucopus, or other lesion on anterior rhinoscopy. See below procedure note as applicable for further exam.    RESPIRATORY: Normal inspiration and expiration and chest wall expansion, no use of accessory muscles to breathe, no stridor.  NEUROLOGICAL: Patient is ambulatory without assist. Mentation is clear. Answering questions appropriately.     Nasal / sinus endoscopy    Date/Time: 8/8/2025 8:07 AM    Performed by: CHYNA Nelson  Authorized by: CHYNA Nelson    Consent:     Consent obtained:  Verbal    Consent  given by:  Patient    Risks discussed:  Bleeding, infection and pain    Alternatives discussed:  No treatment, observation and delayed treatment  Procedure details:     Indications: assessment of airway and sino-nasal symptoms      Medication:  Afrin and Anuj-Synephrine 2%    Instrument: flexible fiberoptic nasal endoscope      Scope location: bilateral nare    Post-procedure details:     Patient tolerance of procedure:  Tolerated well, no immediate complications  Comments:      Findings: After topical decongestion with decongestant and anesthetic spray, nasal endoscopy was performed using an endoscope. The septum was midline. The inferior turbinates were normal. The middle turbinates were edematous bilaterally.  Sphenoidotomies are clear. The maxillary and ethmoid sinuses are widely patent. Bilateral maxillary sinuses are patent without purulence or edema. The frontal recesses were clear. Left and right frontal sinusotomy is patent. No pus or polyps were noted. There is no CSF leak. Things are healing well. The patient tolerated the procedure well and there were no complications. The nasal passageway is patent. The nasopharynx was clear. There were no complications and the patient tolerated the procedure well.        Assessment/Plan     Aretha Nuñez is a 29 y.o. female h/o CRS, DNS, nasal obstruction/drainage s/p bilateral ESS (total), septoplasty, bilateral SOTERO, partial removal of right middle turbinate. The patient had surgery 1/3/2024.  1/11/24 - 1st post op. Is having persistent bleeding episodes. Expected post op swelling today. Debridement performed as noted. Rx budesonide irrigations BID x 1 month. Continue saline irrigations. Advised on Afrin usage and pinching nose and continuing restrictions until 2 weeks post op then gradual increase in activity thereafter.  1/25/24 - 2nd post op. Completed Zpak and medrol. Pt still reports extreme fatigue. No infection but significant post-surgical edema.  Continue  budesonide irrigations BID. Rx pred x 12 days.  3/28/24 - No infection or edema. Mainly clear drainage. Start fluticasone and azelastine. Follow up with Hilary Thibodeaux.  10/25/24- Infection to the left max. After course of doxycycline. Declines oral pred. And oral antibiotics pending culture results.  1/31/25- No infection. Mild edema to the bilateral max and frontals s/p COVID. Will continue budesonide once daily and add Xhance.  8/8/25- Sinuses PRISTINE. Start Dymista for middle turbinate edema.        Plan  Nasal endoscopy. Findings: as noted.  Advised to begin Dymista for middle turbinate edema. Advised 1 spray twice daily.  She may call if she experiences colored drainage and pressure lasting more than 7 days for oral antibiotic therapy.  She may use saline irrigations as needed.  Follow up in 12 months or sooner if needed.

## 2025-08-08 NOTE — PATIENT INSTRUCTIONS
Today you were evaluated by Hilary Thibodeaux CNP.    Please follow-up in 1 year or sooner if needed. If you have any questions or concerns, please contact my office at [phone].     Begin Dymista 1 spray in each nostril twice daily.    MEDICATED NASAL SPRAY INSTRUCTIONS  Please take the prescribed nasal spray as directed. BE SURE TO POINT THE SPRAY TOWARDS THE CORNER OF THE EYE ON THE SAME SIDE NOSTRIL. This will ensure you are treating the appropriate parts of your nose that are swollen or inflamed.  This medication will take upwards of one month before you notice full benefit. You MUST use it every day for it to be effective.

## 2025-08-22 ENCOUNTER — OFFICE VISIT (OUTPATIENT)
Dept: PRIMARY CARE | Facility: CLINIC | Age: 30
End: 2025-08-22
Payer: COMMERCIAL

## 2025-08-22 ASSESSMENT — ENCOUNTER SYMPTOMS
ABDOMINAL PAIN: 0
COUGH: 1
ARTHRALGIAS: 0
LIGHT-HEADEDNESS: 0
VOMITING: 0
SINUS PRESSURE: 0
HEADACHES: 0
NAUSEA: 0
MYALGIAS: 1
CONSTIPATION: 0
DIARRHEA: 0
SINUS PAIN: 0
CHILLS: 1
RHINORRHEA: 1
SHORTNESS OF BREATH: 0
DIZZINESS: 0
FATIGUE: 1
FEVER: 1

## 2025-10-10 ENCOUNTER — APPOINTMENT (OUTPATIENT)
Dept: PRIMARY CARE | Facility: CLINIC | Age: 30
End: 2025-10-10
Payer: COMMERCIAL

## 2025-12-19 ENCOUNTER — APPOINTMENT (OUTPATIENT)
Dept: PRIMARY CARE | Facility: CLINIC | Age: 30
End: 2025-12-19
Payer: COMMERCIAL

## 2026-08-14 ENCOUNTER — APPOINTMENT (OUTPATIENT)
Dept: OTOLARYNGOLOGY | Facility: CLINIC | Age: 31
End: 2026-08-14
Payer: COMMERCIAL

## (undated) DEVICE — HOLSTER, JET SAFETY

## (undated) DEVICE — ELECTRODE, ELECTROSURGICAL, COAGULATOR, W/SUCTION, HANDSWITCHING, 8 FR, 6 IN

## (undated) DEVICE — TOWEL, SURGICAL, NEURO, O/R, 16 X 26, BLUE, STERILE

## (undated) DEVICE — XEROGEL, NASAL/EPISTAXIS PACK, 8/BOX

## (undated) DEVICE — TRACKER, STINGRAY, NON-INVASIVE, AXIEM STEALTH NAVIGATION

## (undated) DEVICE — Device

## (undated) DEVICE — DRAPE, FLUID WARMER

## (undated) DEVICE — BOWL, BASIN, 32 OZ, STERILE

## (undated) DEVICE — TUBING, IRRIGATION FOR M4

## (undated) DEVICE — BLADE, 60 DEGREE, 2.9MM, MICRODEBRIDER

## (undated) DEVICE — TRAP, SPECIMEN, NEPTUNE QUICK COLLECTOR

## (undated) DEVICE — TUBE, SALEM SUMP, 16 FR X 48IN, ENFIT

## (undated) DEVICE — TRACKER, INSTRUMENT